# Patient Record
Sex: MALE | Race: WHITE | Employment: OTHER | URBAN - METROPOLITAN AREA
[De-identification: names, ages, dates, MRNs, and addresses within clinical notes are randomized per-mention and may not be internally consistent; named-entity substitution may affect disease eponyms.]

---

## 2017-01-16 RX ORDER — FENOFIBRATE 160 MG/1
TABLET ORAL
Qty: 30 TABLET | Refills: 11 | Status: SHIPPED | OUTPATIENT
Start: 2017-01-16 | End: 2017-02-21

## 2017-01-16 RX ORDER — METOPROLOL SUCCINATE 50 MG/1
TABLET, EXTENDED RELEASE ORAL
Qty: 30 TABLET | Refills: 11 | Status: SHIPPED | OUTPATIENT
Start: 2017-01-16 | End: 2017-02-21

## 2017-01-16 RX ORDER — OLMESARTAN MEDOXOMIL 40 MG/1
TABLET ORAL
Qty: 30 TABLET | Refills: 11 | Status: SHIPPED | OUTPATIENT
Start: 2017-01-16 | End: 2017-02-21

## 2017-02-21 ENCOUNTER — OFFICE VISIT (OUTPATIENT)
Dept: INTERNAL MEDICINE CLINIC | Facility: CLINIC | Age: 62
End: 2017-02-21

## 2017-02-21 VITALS
OXYGEN SATURATION: 97 % | SYSTOLIC BLOOD PRESSURE: 138 MMHG | WEIGHT: 225 LBS | RESPIRATION RATE: 18 BRPM | HEIGHT: 73 IN | DIASTOLIC BLOOD PRESSURE: 82 MMHG | HEART RATE: 71 BPM | BODY MASS INDEX: 29.82 KG/M2

## 2017-02-21 DIAGNOSIS — I10 ESSENTIAL HYPERTENSION: ICD-10-CM

## 2017-02-21 DIAGNOSIS — Z00.00 WELLNESS EXAMINATION: Primary | ICD-10-CM

## 2017-02-21 DIAGNOSIS — Z12.5 SCREENING FOR PROSTATE CANCER: ICD-10-CM

## 2017-02-21 DIAGNOSIS — E78.1 HYPERTRIGLYCERIDEMIA: ICD-10-CM

## 2017-02-21 LAB
ALBUMIN SERPL BCP-MCNC: 4.5 G/DL (ref 3.5–4.8)
ALBUMIN/GLOB SERPL: 1.5 {RATIO} (ref 1–2)
ALP SERPL-CCNC: 35 U/L (ref 32–100)
ALT SERPL-CCNC: 46 U/L (ref 17–63)
ANION GAP SERPL CALC-SCNC: 8 MMOL/L (ref 0–18)
AST SERPL-CCNC: 32 U/L (ref 15–41)
BILIRUB SERPL-MCNC: 1.4 MG/DL (ref 0.3–1.2)
BUN SERPL-MCNC: 19 MG/DL (ref 8–20)
BUN/CREAT SERPL: 16.7 (ref 10–20)
CALCIUM SERPL-MCNC: 9.7 MG/DL (ref 8.5–10.5)
CHLORIDE SERPL-SCNC: 107 MMOL/L (ref 95–110)
CHOLEST SERPL-MCNC: 128 MG/DL (ref 110–200)
CO2 SERPL-SCNC: 27 MMOL/L (ref 22–32)
CREAT SERPL-MCNC: 1.14 MG/DL (ref 0.5–1.5)
ERYTHROCYTE [DISTWIDTH] IN BLOOD BY AUTOMATED COUNT: 13.7 % (ref 11–15)
GLOBULIN PLAS-MCNC: 3 G/DL (ref 2.5–3.7)
GLUCOSE SERPL-MCNC: 99 MG/DL (ref 70–99)
HCT VFR BLD AUTO: 49.3 % (ref 41–52)
HDLC SERPL-MCNC: 24 MG/DL
HGB BLD-MCNC: 16.5 G/DL (ref 13.5–17.5)
LDLC SERPL CALC-MCNC: 57 MG/DL (ref 0–99)
MCH RBC QN AUTO: 28.4 PG (ref 27–32)
MCHC RBC AUTO-ENTMCNC: 33.5 G/DL (ref 32–37)
MCV RBC AUTO: 84.8 FL (ref 80–100)
NONHDLC SERPL-MCNC: 104 MG/DL
OSMOLALITY UR CALC.SUM OF ELEC: 296 MOSM/KG (ref 275–295)
PLATELET # BLD AUTO: 191 K/UL (ref 140–400)
PMV BLD AUTO: 10.6 FL (ref 7.4–10.3)
POTASSIUM SERPL-SCNC: 4.3 MMOL/L (ref 3.3–5.1)
PROT SERPL-MCNC: 7.5 G/DL (ref 5.9–8.4)
PSA SERPL-MCNC: 0.8 NG/ML (ref 0–4)
RBC # BLD AUTO: 5.81 M/UL (ref 4.5–5.9)
SODIUM SERPL-SCNC: 142 MMOL/L (ref 136–144)
TRIGL SERPL-MCNC: 237 MG/DL (ref 1–149)
WBC # BLD AUTO: 5.2 K/UL (ref 4–11)

## 2017-02-21 PROCEDURE — 99396 PREV VISIT EST AGE 40-64: CPT | Performed by: INTERNAL MEDICINE

## 2017-02-21 PROCEDURE — 36415 COLL VENOUS BLD VENIPUNCTURE: CPT | Performed by: INTERNAL MEDICINE

## 2017-02-21 PROCEDURE — 80053 COMPREHEN METABOLIC PANEL: CPT | Performed by: INTERNAL MEDICINE

## 2017-02-21 PROCEDURE — 80061 LIPID PANEL: CPT | Performed by: INTERNAL MEDICINE

## 2017-02-21 PROCEDURE — 85027 COMPLETE CBC AUTOMATED: CPT | Performed by: INTERNAL MEDICINE

## 2017-02-21 RX ORDER — FENOFIBRATE 130 MG/1
130 CAPSULE ORAL
Qty: 306 CAPSULE | Refills: 6 | Status: SHIPPED | OUTPATIENT
Start: 2017-02-21 | End: 2019-01-11 | Stop reason: DRUGHIGH

## 2017-02-21 RX ORDER — OLMESARTAN MEDOXOMIL 40 MG/1
TABLET ORAL
Qty: 30 TABLET | Refills: 11 | Status: SHIPPED | OUTPATIENT
Start: 2017-02-21 | End: 2017-04-12

## 2017-02-21 RX ORDER — METOPROLOL SUCCINATE 50 MG/1
50 TABLET, EXTENDED RELEASE ORAL
Qty: 30 TABLET | Refills: 11 | Status: SHIPPED | OUTPATIENT
Start: 2017-02-21 | End: 2017-04-03

## 2017-02-21 RX ORDER — FENOFIBRATE 160 MG/1
160 TABLET ORAL
Qty: 30 TABLET | Refills: 11 | Status: SHIPPED | OUTPATIENT
Start: 2017-02-21 | End: 2017-04-11

## 2017-02-28 ENCOUNTER — TELEPHONE (OUTPATIENT)
Dept: INTERNAL MEDICINE CLINIC | Facility: CLINIC | Age: 62
End: 2017-02-28

## 2017-04-03 RX ORDER — METOPROLOL SUCCINATE 50 MG/1
50 TABLET, EXTENDED RELEASE ORAL
Qty: 30 TABLET | Refills: 11 | Status: SHIPPED | OUTPATIENT
Start: 2017-04-03 | End: 2018-04-06

## 2017-04-11 RX ORDER — FENOFIBRATE 160 MG/1
160 TABLET ORAL
Qty: 90 TABLET | Refills: 3 | Status: SHIPPED | OUTPATIENT
Start: 2017-04-11 | End: 2018-04-06

## 2017-04-12 RX ORDER — OLMESARTAN MEDOXOMIL 40 MG/1
TABLET ORAL
Qty: 90 TABLET | Refills: 2 | Status: SHIPPED | OUTPATIENT
Start: 2017-04-12 | End: 2018-04-06

## 2018-04-06 ENCOUNTER — OFFICE VISIT (OUTPATIENT)
Dept: INTERNAL MEDICINE CLINIC | Facility: CLINIC | Age: 63
End: 2018-04-06

## 2018-04-06 VITALS
WEIGHT: 229 LBS | HEIGHT: 73 IN | OXYGEN SATURATION: 98 % | HEART RATE: 69 BPM | TEMPERATURE: 99 F | RESPIRATION RATE: 18 BRPM | DIASTOLIC BLOOD PRESSURE: 80 MMHG | BODY MASS INDEX: 30.35 KG/M2 | SYSTOLIC BLOOD PRESSURE: 136 MMHG

## 2018-04-06 DIAGNOSIS — E78.1 PURE HYPERGLYCERIDEMIA: ICD-10-CM

## 2018-04-06 DIAGNOSIS — Z00.00 WELLNESS EXAMINATION: Primary | ICD-10-CM

## 2018-04-06 DIAGNOSIS — Z12.11 COLON CANCER SCREENING: ICD-10-CM

## 2018-04-06 DIAGNOSIS — I10 ESSENTIAL HYPERTENSION: ICD-10-CM

## 2018-04-06 DIAGNOSIS — E78.1 HYPERTRIGLYCERIDEMIA: ICD-10-CM

## 2018-04-06 DIAGNOSIS — Z12.5 PROSTATE CANCER SCREENING: ICD-10-CM

## 2018-04-06 PROCEDURE — 99396 PREV VISIT EST AGE 40-64: CPT | Performed by: INTERNAL MEDICINE

## 2018-04-06 PROCEDURE — 80061 LIPID PANEL: CPT | Performed by: INTERNAL MEDICINE

## 2018-04-06 PROCEDURE — 36415 COLL VENOUS BLD VENIPUNCTURE: CPT | Performed by: INTERNAL MEDICINE

## 2018-04-06 PROCEDURE — 84153 ASSAY OF PSA TOTAL: CPT | Performed by: INTERNAL MEDICINE

## 2018-04-06 PROCEDURE — 80053 COMPREHEN METABOLIC PANEL: CPT | Performed by: INTERNAL MEDICINE

## 2018-04-06 PROCEDURE — 85025 COMPLETE CBC W/AUTO DIFF WBC: CPT | Performed by: INTERNAL MEDICINE

## 2018-04-06 RX ORDER — OLMESARTAN MEDOXOMIL 40 MG/1
TABLET ORAL
Qty: 90 TABLET | Refills: 3 | Status: SHIPPED | OUTPATIENT
Start: 2018-04-06 | End: 2019-01-11

## 2018-04-06 RX ORDER — METOPROLOL SUCCINATE 50 MG/1
50 TABLET, EXTENDED RELEASE ORAL
Qty: 30 TABLET | Refills: 11 | Status: SHIPPED | OUTPATIENT
Start: 2018-04-06 | End: 2018-04-06

## 2018-04-06 RX ORDER — FENOFIBRATE 160 MG/1
160 TABLET ORAL
Qty: 90 TABLET | Refills: 3 | Status: SHIPPED | OUTPATIENT
Start: 2018-04-06 | End: 2018-04-12

## 2018-04-06 RX ORDER — OLMESARTAN MEDOXOMIL 40 MG/1
TABLET ORAL
Qty: 90 TABLET | Refills: 2 | Status: SHIPPED | OUTPATIENT
Start: 2018-04-06 | End: 2018-04-06

## 2018-04-06 RX ORDER — METOPROLOL SUCCINATE 50 MG/1
50 TABLET, EXTENDED RELEASE ORAL
Qty: 30 TABLET | Refills: 11 | Status: SHIPPED | OUTPATIENT
Start: 2018-04-06 | End: 2018-04-11

## 2018-04-06 RX ORDER — METOPROLOL SUCCINATE 50 MG/1
50 TABLET, EXTENDED RELEASE ORAL
Qty: 90 TABLET | Refills: 3 | Status: SHIPPED | OUTPATIENT
Start: 2018-04-06 | End: 2019-01-11

## 2018-04-06 RX ORDER — OLMESARTAN MEDOXOMIL 40 MG/1
TABLET ORAL
Qty: 90 TABLET | Refills: 2 | Status: SHIPPED | OUTPATIENT
Start: 2018-04-06 | End: 2018-04-11

## 2018-04-06 NOTE — PROGRESS NOTES
HPI:    Patient ID: Tristan Emery is a 58year old male. Patient here for annual wellness check up and fu on htn and hypertriglyceridemia. Is due for fasting blood work. Ias likely due for colonoscopy - will check his records.     Review of Systems no tenderness or effusion. Neurological: He is alert and oriented to person, place, and time. Skin: No rash noted. Psychiatric: He has a normal mood and affect.  His behavior is normal. Judgment and thought content normal.              ASSESSMENT/PLAN

## 2018-04-06 NOTE — PROGRESS NOTES
Pt presented to clinic today for blood draw. Per physician able to draw orders. Orders  documented within chart. Pt tolerated lab draw well.  verified.   Orders drawn include: cbc, psa, cmp, lipid   Site of draw: rt felicia Esposito CMA

## 2018-04-11 RX ORDER — METOPROLOL SUCCINATE 50 MG/1
50 TABLET, EXTENDED RELEASE ORAL
Qty: 30 TABLET | Refills: 11 | Status: SHIPPED | OUTPATIENT
Start: 2018-04-11 | End: 2019-01-11

## 2018-04-11 RX ORDER — OLMESARTAN MEDOXOMIL 40 MG/1
TABLET ORAL
Qty: 90 TABLET | Refills: 2 | Status: SHIPPED | OUTPATIENT
Start: 2018-04-11 | End: 2019-01-11

## 2018-04-12 RX ORDER — FENOFIBRATE 160 MG/1
160 TABLET ORAL
Qty: 90 TABLET | Refills: 3 | Status: SHIPPED | OUTPATIENT
Start: 2018-04-12 | End: 2019-01-11

## 2018-10-18 ENCOUNTER — TELEPHONE (OUTPATIENT)
Dept: GASTROENTEROLOGY | Facility: CLINIC | Age: 63
End: 2018-10-18

## 2018-10-18 ENCOUNTER — OFFICE VISIT (OUTPATIENT)
Dept: GASTROENTEROLOGY | Facility: CLINIC | Age: 63
End: 2018-10-18
Payer: COMMERCIAL

## 2018-10-18 VITALS
BODY MASS INDEX: 30.09 KG/M2 | HEART RATE: 68 BPM | DIASTOLIC BLOOD PRESSURE: 101 MMHG | SYSTOLIC BLOOD PRESSURE: 160 MMHG | HEIGHT: 73 IN | WEIGHT: 227 LBS

## 2018-10-18 DIAGNOSIS — Z12.12 SCREENING FOR COLORECTAL CANCER: Primary | ICD-10-CM

## 2018-10-18 DIAGNOSIS — Z12.11 SCREEN FOR COLON CANCER: Primary | ICD-10-CM

## 2018-10-18 DIAGNOSIS — Z12.11 SCREENING FOR COLORECTAL CANCER: Primary | ICD-10-CM

## 2018-10-18 PROCEDURE — 99243 OFF/OP CNSLTJ NEW/EST LOW 30: CPT | Performed by: INTERNAL MEDICINE

## 2018-10-18 NOTE — TELEPHONE ENCOUNTER
Scheduled for:  Colonoscopy [id number]  Provider Name: Dr. Juan Francisco Limon  Date:  12/3/18  Location:  [facility]  Sedation:  IV  Time:  8:30 am, arrival 7:30 am  Prep: Suprep  Meds/Allergies Reconciled?:  Physician reviewed  Diagnosis with codes:  Screening Z12.11  Was patient in

## 2018-10-18 NOTE — PROGRESS NOTES
[facility], [facility] - Gastroenterology                                                                                                  Clinic History and Physical mg total) by mouth once daily. Disp: 90 tablet Rfl: 3   Olmesartan Medoxomil 40 MG Oral Tab TAKE 1 TABLET (40 MG TOTAL) BY MOUTH ONCE DAILY.  Disp: 90 tablet Rfl: 2   Metoprolol Succinate ER 50 MG Oral Tablet 24 Hr Take 1 tablet (50 mg total) by mouth once perforation, as well as the cardiopulmonary risks of anesthesia and all potentially leading to prolonged hospitalization or surgical intervention. I also mentioned the possibility of missed lesion. All questions were answered to the patient’s satisfaction.

## 2018-10-18 NOTE — PATIENT INSTRUCTIONS
1. Schedule colonoscopy with IV/conscious sedation with Dr. Courtney Board    2.  bowel prep from pharmacy (split Suprep )    3. Continue all medications for procedure    4. Read all bowel prep instructions carefully    5.  AVOID seeds, nuts, popcorn, raw

## 2018-12-03 ENCOUNTER — TELEPHONE (OUTPATIENT)
Dept: GASTROENTEROLOGY | Facility: CLINIC | Age: 63
End: 2018-12-03

## 2018-12-03 ENCOUNTER — HOSPITAL ENCOUNTER (OUTPATIENT)
Facility: HOSPITAL | Age: 63
Setting detail: HOSPITAL OUTPATIENT SURGERY
Discharge: HOME | End: 2018-12-03
Attending: INTERNAL MEDICINE | Admitting: INTERNAL MEDICINE
Payer: COMMERCIAL

## 2018-12-03 DIAGNOSIS — Z12.11 SCREEN FOR COLON CANCER: ICD-10-CM

## 2018-12-03 PROCEDURE — G0500 MOD SEDAT ENDO SERVICE >5YRS: HCPCS | Performed by: INTERNAL MEDICINE

## 2018-12-03 PROCEDURE — 0DBK8ZX EXCISION OF ASCENDING COLON, VIA NATURAL OR ARTIFICIAL OPENING ENDOSCOPIC, DIAGNOSTIC: ICD-10-PCS | Performed by: INTERNAL MEDICINE

## 2018-12-03 PROCEDURE — 0DBP8ZX EXCISION OF RECTUM, VIA NATURAL OR ARTIFICIAL OPENING ENDOSCOPIC, DIAGNOSTIC: ICD-10-PCS | Performed by: INTERNAL MEDICINE

## 2018-12-03 PROCEDURE — 45385 COLONOSCOPY W/LESION REMOVAL: CPT | Performed by: INTERNAL MEDICINE

## 2018-12-03 PROCEDURE — 0DBN8ZX EXCISION OF SIGMOID COLON, VIA NATURAL OR ARTIFICIAL OPENING ENDOSCOPIC, DIAGNOSTIC: ICD-10-PCS | Performed by: INTERNAL MEDICINE

## 2018-12-03 PROCEDURE — 45380 COLONOSCOPY AND BIOPSY: CPT | Performed by: INTERNAL MEDICINE

## 2018-12-03 PROCEDURE — 0DBL8ZX EXCISION OF TRANSVERSE COLON, VIA NATURAL OR ARTIFICIAL OPENING ENDOSCOPIC, DIAGNOSTIC: ICD-10-PCS | Performed by: INTERNAL MEDICINE

## 2018-12-03 RX ORDER — MIDAZOLAM HYDROCHLORIDE 1 MG/ML
INJECTION INTRAMUSCULAR; INTRAVENOUS
Status: DISCONTINUED | OUTPATIENT
Start: 2018-12-03 | End: 2018-12-03

## 2018-12-03 RX ORDER — SODIUM CHLORIDE 0.9 % (FLUSH) 0.9 %
10 SYRINGE (ML) INJECTION AS NEEDED
Status: DISCONTINUED | OUTPATIENT
Start: 2018-12-03 | End: 2018-12-03

## 2018-12-03 RX ORDER — SODIUM CHLORIDE, SODIUM LACTATE, POTASSIUM CHLORIDE, CALCIUM CHLORIDE 600; 310; 30; 20 MG/100ML; MG/100ML; MG/100ML; MG/100ML
INJECTION, SOLUTION INTRAVENOUS CONTINUOUS
Status: DISCONTINUED | OUTPATIENT
Start: 2018-12-03 | End: 2018-12-03

## 2018-12-03 RX ORDER — MIDAZOLAM HYDROCHLORIDE 1 MG/ML
1 INJECTION INTRAMUSCULAR; INTRAVENOUS EVERY 5 MIN PRN
Status: DISCONTINUED | OUTPATIENT
Start: 2018-12-03 | End: 2018-12-03

## 2018-12-03 NOTE — H&P
History & Physical Examination    Patient Name: Peter Palmer  MRN: [id number]  [facility]: [id number]  YOB: 1955    Diagnosis: colorectal cancer screening      Medications Prior to Admission:  Fenofibrate 160 MG Oral Tab Take 1 tablet (160 mg t 12/1/2008     History reviewed. No pertinent family history.   Social History    Tobacco Use      Smoking status: Never Smoker      Smokeless tobacco: Never Used    Alcohol use: No      SYSTEM Check if Physical Exam is Normal If not normal, please explain:

## 2018-12-03 NOTE — OPERATIVE REPORT
Colonoscopy Report    Lorna Miguel Angelbelem     1955 Age 58year old   PCP Rossana Ferguson MD Endoscopist Sweta Gonzalez MD     Date of procedure: 18    Procedure: Colonoscopy w/ biopsy and snare polypectomy    Pre-operative diagnosis: colore Withdrawal time was 20 minutes. Air was then withdrawn and the endoscope was removed. The patient tolerated the procedure well. There were no immediate postoperative complications.  The patient’s vital signs were monitored throughout the procedure and re have not received your pathology results either by phone or letter within 2 weeks, please call our office at [phone].       Daya Solis MD  [address] [facility] - Gastroenterology  12/3/2018  9:06 AM

## 2018-12-03 NOTE — TELEPHONE ENCOUNTER
I mailed out colonoscopy results letter to pt  Updated health maintenance  Entered into 5yr recall  Recall colon in 5 years per.  Colon done 12/03/18     GI staff: please place recall for colonoscopy in 5 years

## 2018-12-04 VITALS
HEART RATE: 62 BPM | WEIGHT: 215 LBS | SYSTOLIC BLOOD PRESSURE: 138 MMHG | HEIGHT: 72 IN | DIASTOLIC BLOOD PRESSURE: 93 MMHG | BODY MASS INDEX: 29.12 KG/M2 | OXYGEN SATURATION: 95 % | RESPIRATION RATE: 20 BRPM

## 2019-01-11 ENCOUNTER — OFFICE VISIT (OUTPATIENT)
Dept: INTERNAL MEDICINE CLINIC | Facility: CLINIC | Age: 64
End: 2019-01-11
Payer: COMMERCIAL

## 2019-01-11 VITALS
WEIGHT: 226 LBS | SYSTOLIC BLOOD PRESSURE: 130 MMHG | HEIGHT: 72 IN | BODY MASS INDEX: 30.61 KG/M2 | TEMPERATURE: 98 F | OXYGEN SATURATION: 99 % | DIASTOLIC BLOOD PRESSURE: 86 MMHG | HEART RATE: 63 BPM

## 2019-01-11 DIAGNOSIS — I10 ESSENTIAL HYPERTENSION: Primary | ICD-10-CM

## 2019-01-11 DIAGNOSIS — E78.1 PURE HYPERGLYCERIDEMIA: ICD-10-CM

## 2019-01-11 DIAGNOSIS — Z12.5 SCREENING FOR PROSTATE CANCER: ICD-10-CM

## 2019-01-11 LAB
ALBUMIN SERPL BCP-MCNC: 4.5 G/DL (ref 3.5–4.8)
ALBUMIN/GLOB SERPL: 1.7 {RATIO} (ref 1–2)
ALP SERPL-CCNC: 38 U/L (ref 32–100)
ALT SERPL-CCNC: 33 U/L (ref 17–63)
ANION GAP SERPL CALC-SCNC: 12 MMOL/L (ref 0–18)
AST SERPL-CCNC: 32 U/L (ref 15–41)
BASOPHILS # BLD: 0.1 K/UL (ref 0–0.2)
BASOPHILS NFR BLD: 1 %
BILIRUB SERPL-MCNC: 1.3 MG/DL (ref 0.3–1.2)
BUN SERPL-MCNC: 15 MG/DL (ref 8–20)
BUN/CREAT SERPL: 12.9 (ref 10–20)
CALCIUM SERPL-MCNC: 10 MG/DL (ref 8.5–10.5)
CHLORIDE SERPL-SCNC: 105 MMOL/L (ref 95–110)
CHOLEST SERPL-MCNC: 156 MG/DL (ref 110–200)
CO2 SERPL-SCNC: 25 MMOL/L (ref 22–32)
COMPLEXED PSA SERPL-MCNC: 1.24 NG/ML (ref 0.01–4)
CREAT SERPL-MCNC: 1.16 MG/DL (ref 0.5–1.5)
EOSINOPHIL # BLD: 0.2 K/UL (ref 0–0.7)
EOSINOPHIL NFR BLD: 4 %
ERYTHROCYTE [DISTWIDTH] IN BLOOD BY AUTOMATED COUNT: 13.8 % (ref 11–15)
GLOBULIN PLAS-MCNC: 2.7 G/DL (ref 2.5–3.7)
GLUCOSE SERPL-MCNC: 118 MG/DL (ref 70–99)
HCT VFR BLD AUTO: 49.8 % (ref 41–52)
HDLC SERPL-MCNC: 39 MG/DL
HGB BLD-MCNC: 16.7 G/DL (ref 13.5–17.5)
LDLC SERPL CALC-MCNC: 77 MG/DL (ref 0–99)
LYMPHOCYTES # BLD: 1.5 K/UL (ref 1–4)
LYMPHOCYTES NFR BLD: 25 %
MCH RBC QN AUTO: 28.8 PG (ref 27–32)
MCHC RBC AUTO-ENTMCNC: 33.5 G/DL (ref 32–37)
MCV RBC AUTO: 86.1 FL (ref 80–100)
MONOCYTES # BLD: 0.5 K/UL (ref 0–1)
MONOCYTES NFR BLD: 8 %
NEUTROPHILS # BLD AUTO: 3.9 K/UL (ref 1.8–7.7)
NEUTROPHILS NFR BLD: 63 %
NONHDLC SERPL-MCNC: 117 MG/DL
OSMOLALITY UR CALC.SUM OF ELEC: 296 MOSM/KG (ref 275–295)
PATIENT FASTING: YES
PLATELET # BLD AUTO: 176 K/UL (ref 140–400)
PMV BLD AUTO: 10.1 FL (ref 7.4–10.3)
POTASSIUM SERPL-SCNC: 3.8 MMOL/L (ref 3.3–5.1)
PROT SERPL-MCNC: 7.2 G/DL (ref 5.9–8.4)
PSA SERPL-MCNC: 1 NG/ML (ref 0–4)
RBC # BLD AUTO: 5.78 M/UL (ref 4.5–5.9)
SODIUM SERPL-SCNC: 142 MMOL/L (ref 136–144)
TRIGL SERPL-MCNC: 200 MG/DL (ref 1–149)
WBC # BLD AUTO: 6.2 K/UL (ref 4–11)

## 2019-01-11 PROCEDURE — 85025 COMPLETE CBC W/AUTO DIFF WBC: CPT | Performed by: INTERNAL MEDICINE

## 2019-01-11 PROCEDURE — 80061 LIPID PANEL: CPT | Performed by: INTERNAL MEDICINE

## 2019-01-11 PROCEDURE — 80053 COMPREHEN METABOLIC PANEL: CPT | Performed by: INTERNAL MEDICINE

## 2019-01-11 PROCEDURE — 84153 ASSAY OF PSA TOTAL: CPT | Performed by: INTERNAL MEDICINE

## 2019-01-11 PROCEDURE — 99214 OFFICE O/P EST MOD 30 MIN: CPT | Performed by: INTERNAL MEDICINE

## 2019-01-11 RX ORDER — FENOFIBRATE 160 MG/1
160 TABLET ORAL
Qty: 90 TABLET | Refills: 3 | Status: SHIPPED | OUTPATIENT
Start: 2019-01-11 | End: 2019-08-26 | Stop reason: ALTCHOICE

## 2019-01-11 RX ORDER — OLMESARTAN MEDOXOMIL 40 MG/1
TABLET ORAL
Qty: 90 TABLET | Refills: 3 | Status: SHIPPED | OUTPATIENT
Start: 2019-01-11 | End: 2019-08-26 | Stop reason: RX

## 2019-01-11 RX ORDER — METOPROLOL SUCCINATE 50 MG/1
50 TABLET, EXTENDED RELEASE ORAL
Qty: 90 TABLET | Refills: 3 | Status: SHIPPED | OUTPATIENT
Start: 2019-01-11 | End: 2020-04-09

## 2019-01-11 NOTE — PROGRESS NOTES
HPI:    Patient ID: Rito Rudd is a 58year old male. Pt here for a recheck of Bp and hyperlipidpemia. HAd colonoscopy - 5 polyps. Recently retired from work. Colonoscopy showed 5 polyps - repeat in  5 years.     Review of Systems   Constituti polyps - repeat colonoscopy in 5 years  No orders of the defined types were placed in this encounter.       Meds This Visit:  Requested Prescriptions     Signed Prescriptions Disp Refills   • Fenofibrate 160 MG Oral Tab 90 tablet 3     Sig: Take 1 tablet (1

## 2019-03-11 ENCOUNTER — TELEPHONE (OUTPATIENT)
Dept: INTERNAL MEDICINE CLINIC | Facility: CLINIC | Age: 64
End: 2019-03-11

## 2019-03-11 NOTE — TELEPHONE ENCOUNTER
Fax received from pharmacy. Olemsartan Medoxomil 40 mg is on backorder. Please change to different strength or medication.

## 2019-03-14 ENCOUNTER — NURSE ONLY (OUTPATIENT)
Dept: INTERNAL MEDICINE CLINIC | Facility: CLINIC | Age: 64
End: 2019-03-14
Payer: MEDICARE

## 2019-03-14 DIAGNOSIS — R73.9 HYPERGLYCEMIA: ICD-10-CM

## 2019-03-14 PROCEDURE — 36415 COLL VENOUS BLD VENIPUNCTURE: CPT | Performed by: INTERNAL MEDICINE

## 2019-03-14 NOTE — PROGRESS NOTES
Pt presented to clinic today for blood draw. Per physician able to draw orders. Orders  documented within chart. Pt tolerated lab draw well.  verified.   Orders drawn include: A1C  Site of draw: right arm

## 2019-03-15 LAB — HEMOGLOBIN A1C: 5.4 % OF TOTAL HGB

## 2019-08-26 ENCOUNTER — OFFICE VISIT (OUTPATIENT)
Dept: INTERNAL MEDICINE CLINIC | Facility: CLINIC | Age: 64
End: 2019-08-26
Payer: MEDICARE

## 2019-08-26 VITALS
HEART RATE: 65 BPM | HEIGHT: 72 IN | BODY MASS INDEX: 29.99 KG/M2 | OXYGEN SATURATION: 98 % | SYSTOLIC BLOOD PRESSURE: 136 MMHG | DIASTOLIC BLOOD PRESSURE: 82 MMHG | WEIGHT: 221.38 LBS

## 2019-08-26 DIAGNOSIS — I10 ESSENTIAL HYPERTENSION: Primary | ICD-10-CM

## 2019-08-26 DIAGNOSIS — E78.1 PURE HYPERGLYCERIDEMIA: ICD-10-CM

## 2019-08-26 PROCEDURE — 99214 OFFICE O/P EST MOD 30 MIN: CPT | Performed by: INTERNAL MEDICINE

## 2019-08-26 RX ORDER — ALPRAZOLAM 0.25 MG/1
0.25 TABLET ORAL EVERY 6 HOURS PRN
Qty: 30 TABLET | Refills: 1 | Status: SHIPPED | OUTPATIENT
Start: 2019-08-26 | End: 2021-02-12 | Stop reason: WASHOUT

## 2019-08-26 RX ORDER — GEMFIBROZIL 600 MG/1
600 TABLET, FILM COATED ORAL
Qty: 60 TABLET | Refills: 11 | Status: SHIPPED | OUTPATIENT
Start: 2019-08-26 | End: 2020-10-27

## 2019-08-26 NOTE — PROGRESS NOTES
HPI:    Patient ID: Serena Sullivan is a 71year old male. Pt here for a fu on Bp - has had some elevated readings in the last weeks. [place] some Alprazolam  - which helped Bp get better. Review of Systems   Constitutional: Negative.     HENT daily before meals.        Imaging & Referrals:  None         TIME COMPONENT:     Jimmy Aponte MD  8/26/2019

## 2020-02-11 ENCOUNTER — OFFICE VISIT (OUTPATIENT)
Dept: INTERNAL MEDICINE CLINIC | Facility: CLINIC | Age: 65
End: 2020-02-11
Payer: MEDICARE

## 2020-02-11 VITALS
OXYGEN SATURATION: 98 % | WEIGHT: 220 LBS | HEART RATE: 72 BPM | SYSTOLIC BLOOD PRESSURE: 130 MMHG | DIASTOLIC BLOOD PRESSURE: 82 MMHG | HEIGHT: 72 IN | BODY MASS INDEX: 29.8 KG/M2

## 2020-02-11 DIAGNOSIS — E78.1 PURE HYPERGLYCERIDEMIA: ICD-10-CM

## 2020-02-11 DIAGNOSIS — I10 ESSENTIAL HYPERTENSION: ICD-10-CM

## 2020-02-11 DIAGNOSIS — Z00.00 MEDICARE ANNUAL WELLNESS VISIT, SUBSEQUENT: Primary | ICD-10-CM

## 2020-02-11 DIAGNOSIS — Z12.5 SCREENING FOR PROSTATE CANCER: ICD-10-CM

## 2020-02-11 PROCEDURE — 96160 PT-FOCUSED HLTH RISK ASSMT: CPT | Performed by: INTERNAL MEDICINE

## 2020-02-11 PROCEDURE — 99397 PER PM REEVAL EST PAT 65+ YR: CPT | Performed by: INTERNAL MEDICINE

## 2020-02-11 PROCEDURE — G0402 INITIAL PREVENTIVE EXAM: HCPCS | Performed by: INTERNAL MEDICINE

## 2020-02-11 NOTE — PROGRESS NOTES
HPI:   Jen Burks is a 76year old male who presents for a MA Supervisit.     Patient Active Problem List:     HTN (hypertension) controlled with rx     Hypertriglyceridemia on gemfibrozal     Hyperlipidemia as above                General Health ?: No    Do you have a living will?: No   Was Medicare Assessment Questionnaire completed by patient and sent to HIM for scanning? Yes    Please go to \"Cognitive Assessment\" under Medicare Assessment section in Charting, test patient and document. SYSTEMS:   GENERAL: feels well otherwise  SKIN: denies any unusual skin lesions eczema on left earlobe  EYES: denies blurred vision or double vision  HEENT: denies nasal congestion, sinus pain or ST  LUNGS: denies shortness of breath with exertion  CARDIOV wellness visit, subsequent    Essential hypertension    Pure hyperglyceridemia    Screening for prostate cancer         The patient indicates understanding of these issues and agrees to the plan.   The patient is asked to return in 3mfor fu       PREVENTATI No flowsheet data found. Creatinine  Annually Creatinine (mg/dL)   Date Value   01/11/2019 1.16     CREATININE (P) (mg/dL)   Date Value   01/26/2016 1.24    No flowsheet data found.     BUN  Annually BUN (mg/dL)   Date Value   01/11/2019 15   01/26/20 lisinopril 40 MG Oral Tab Take 1 tablet (40 mg total) by mouth daily. 90 tablet 3   • Metoprolol Succinate ER 50 MG Oral Tablet 24 Hr Take 1 tablet (50 mg total) by mouth once daily.  90 tablet 3     Allergies:No Known Allergies   PHYSICAL EXAM:   Physical

## 2020-02-12 LAB
ABSOLUTE BASOPHILS: 61 CELLS/UL (ref 0–200)
ABSOLUTE EOSINOPHILS: 207 CELLS/UL (ref 15–500)
ABSOLUTE LYMPHOCYTES: 1565 CELLS/UL (ref 850–3900)
ABSOLUTE MONOCYTES: 404 CELLS/UL (ref 200–950)
ABSOLUTE NEUTROPHILS: 2463 CELLS/UL (ref 1500–7800)
ALBUMIN/GLOBULIN RATIO: 1.8 (CALC) (ref 1–2.5)
ALBUMIN: 4.6 G/DL (ref 3.6–5.1)
ALKALINE PHOSPHATASE: 45 U/L (ref 35–144)
ALT: 25 U/L (ref 9–46)
AST: 24 U/L (ref 10–35)
BASOPHILS: 1.3 %
BILIRUBIN, TOTAL: 1.1 MG/DL (ref 0.2–1.2)
BUN: 19 MG/DL (ref 7–25)
CALCIUM: 10.2 MG/DL (ref 8.6–10.3)
CARBON DIOXIDE: 24 MMOL/L (ref 20–32)
CHLORIDE: 104 MMOL/L (ref 98–110)
CHOL/HDLC RATIO: 5.1 (CALC)
CHOLESTEROL, TOTAL: 148 MG/DL
CREATININE: 1.06 MG/DL (ref 0.7–1.25)
EGFR IF AFRICN AM: 84 ML/MIN/1.73M2
EGFR IF NONAFRICN AM: 73 ML/MIN/1.73M2
EOSINOPHILS: 4.4 %
GLOBULIN: 2.6 G/DL (CALC) (ref 1.9–3.7)
GLUCOSE: 116 MG/DL (ref 65–99)
HDL CHOLESTEROL: 29 MG/DL
HEMATOCRIT: 49.1 % (ref 38.5–50)
HEMOGLOBIN: 16.8 G/DL (ref 13.2–17.1)
LDL-CHOLESTEROL: 86 MG/DL (CALC)
LYMPHOCYTES: 33.3 %
MCH: 28.9 PG (ref 27–33)
MCHC: 34.2 G/DL (ref 32–36)
MCV: 84.5 FL (ref 80–100)
MONOCYTES: 8.6 %
MPV: 12 FL (ref 7.5–12.5)
NEUTROPHILS: 52.4 %
NON-HDL CHOLESTEROL: 119 MG/DL (CALC)
PLATELET COUNT: 184 THOUSAND/UL (ref 140–400)
POTASSIUM: 4.7 MMOL/L (ref 3.5–5.3)
PROTEIN, TOTAL: 7.2 G/DL (ref 6.1–8.1)
PSA, TOTAL: 1 NG/ML
RDW: 13 % (ref 11–15)
RED BLOOD CELL COUNT: 5.81 MILLION/UL (ref 4.2–5.8)
SODIUM: 138 MMOL/L (ref 135–146)
TRIGLYCERIDES: 239 MG/DL
WHITE BLOOD CELL COUNT: 4.7 THOUSAND/UL (ref 3.8–10.8)

## 2020-04-09 RX ORDER — LISINOPRIL 40 MG/1
TABLET ORAL
Qty: 90 TABLET | Refills: 3 | Status: SHIPPED | OUTPATIENT
Start: 2020-04-09 | End: 2021-02-12

## 2020-04-09 RX ORDER — METOPROLOL SUCCINATE 50 MG/1
TABLET, EXTENDED RELEASE ORAL
Qty: 90 TABLET | Refills: 3 | Status: SHIPPED | OUTPATIENT
Start: 2020-04-09 | End: 2021-02-12

## 2020-06-20 ENCOUNTER — OFFICE VISIT (OUTPATIENT)
Dept: INTERNAL MEDICINE CLINIC | Facility: CLINIC | Age: 65
End: 2020-06-20
Payer: MEDICARE

## 2020-06-20 VITALS
SYSTOLIC BLOOD PRESSURE: 118 MMHG | HEIGHT: 72.64 IN | DIASTOLIC BLOOD PRESSURE: 74 MMHG | WEIGHT: 216 LBS | HEART RATE: 74 BPM | OXYGEN SATURATION: 98 % | BODY MASS INDEX: 28.63 KG/M2

## 2020-06-20 DIAGNOSIS — E78.1 PURE HYPERGLYCERIDEMIA: Primary | ICD-10-CM

## 2020-06-20 DIAGNOSIS — R73.9 HYPERGLYCEMIA: ICD-10-CM

## 2020-06-20 PROCEDURE — 36415 COLL VENOUS BLD VENIPUNCTURE: CPT | Performed by: INTERNAL MEDICINE

## 2020-06-20 PROCEDURE — 99214 OFFICE O/P EST MOD 30 MIN: CPT | Performed by: INTERNAL MEDICINE

## 2020-06-20 NOTE — PROGRESS NOTES
HPI:    Patient ID: Mikal Ortega is a 76year old male. HPI Patient here for fu on hyperglycemia and hypertriglyceridemia. Had elevated glucose and trigs on last blood work up. Has been on low carb diet.     Review of Systems   Constitutional: Ne Visit:  Requested Prescriptions      No prescriptions requested or ordered in this encounter       Imaging & Referrals:  None       MS#[id number]

## 2020-10-27 RX ORDER — GEMFIBROZIL 600 MG/1
600 TABLET, FILM COATED ORAL
Qty: 180 TABLET | Refills: 3 | Status: SHIPPED | OUTPATIENT
Start: 2020-10-27 | End: 2021-02-12 | Stop reason: ALTCHOICE

## 2020-11-10 ENCOUNTER — TELEPHONE (OUTPATIENT)
Dept: INTERNAL MEDICINE CLINIC | Facility: CLINIC | Age: 65
End: 2020-11-10

## 2021-02-12 ENCOUNTER — OFFICE VISIT (OUTPATIENT)
Dept: INTERNAL MEDICINE CLINIC | Facility: CLINIC | Age: 66
End: 2021-02-12
Payer: COMMERCIAL

## 2021-02-12 VITALS
TEMPERATURE: 96 F | WEIGHT: 221 LBS | OXYGEN SATURATION: 97 % | HEIGHT: 72.64 IN | SYSTOLIC BLOOD PRESSURE: 130 MMHG | BODY MASS INDEX: 29.29 KG/M2 | DIASTOLIC BLOOD PRESSURE: 80 MMHG | HEART RATE: 62 BPM

## 2021-02-12 DIAGNOSIS — Z00.00 MEDICARE ANNUAL WELLNESS VISIT, SUBSEQUENT: Primary | ICD-10-CM

## 2021-02-12 DIAGNOSIS — Z12.5 SCREENING FOR PROSTATE CANCER: ICD-10-CM

## 2021-02-12 DIAGNOSIS — Z23 NEED FOR PNEUMOCOCCAL VACCINATION: ICD-10-CM

## 2021-02-12 DIAGNOSIS — I10 ESSENTIAL HYPERTENSION: ICD-10-CM

## 2021-02-12 DIAGNOSIS — E78.1 PURE HYPERGLYCERIDEMIA: ICD-10-CM

## 2021-02-12 DIAGNOSIS — R73.9 HYPERGLYCEMIA: ICD-10-CM

## 2021-02-12 PROCEDURE — 90732 PPSV23 VACC 2 YRS+ SUBQ/IM: CPT | Performed by: INTERNAL MEDICINE

## 2021-02-12 PROCEDURE — G0438 PPPS, INITIAL VISIT: HCPCS | Performed by: INTERNAL MEDICINE

## 2021-02-12 PROCEDURE — G0009 ADMIN PNEUMOCOCCAL VACCINE: HCPCS | Performed by: INTERNAL MEDICINE

## 2021-02-12 PROCEDURE — 96160 PT-FOCUSED HLTH RISK ASSMT: CPT | Performed by: INTERNAL MEDICINE

## 2021-02-12 PROCEDURE — 3008F BODY MASS INDEX DOCD: CPT | Performed by: INTERNAL MEDICINE

## 2021-02-12 PROCEDURE — 3075F SYST BP GE 130 - 139MM HG: CPT | Performed by: INTERNAL MEDICINE

## 2021-02-12 PROCEDURE — 3079F DIAST BP 80-89 MM HG: CPT | Performed by: INTERNAL MEDICINE

## 2021-02-12 PROCEDURE — 99397 PER PM REEVAL EST PAT 65+ YR: CPT | Performed by: INTERNAL MEDICINE

## 2021-02-12 RX ORDER — METOPROLOL SUCCINATE 50 MG/1
50 TABLET, EXTENDED RELEASE ORAL DAILY
Qty: 90 TABLET | Refills: 3 | Status: SHIPPED | OUTPATIENT
Start: 2021-02-12

## 2021-02-12 RX ORDER — FENOFIBRATE 160 MG/1
160 TABLET ORAL DAILY
Qty: 90 TABLET | Refills: 3 | Status: SHIPPED | OUTPATIENT
Start: 2021-02-12 | End: 2021-12-07

## 2021-02-12 RX ORDER — LISINOPRIL 40 MG/1
40 TABLET ORAL DAILY
Qty: 90 TABLET | Refills: 3 | Status: SHIPPED | OUTPATIENT
Start: 2021-02-12

## 2021-02-12 NOTE — PATIENT INSTRUCTIONS
Pt came in for pneumovax 23 injection. Injection was administrated on left arm IM. Pt tolerated well.

## 2021-02-12 NOTE — PROGRESS NOTES
HPI:    Patient ID: Chu Alcantara is a 78year old male. HPI Pt here for a Ma supervisit and fu on htn and hypertriglyceridemia. Has no new complaints currently. HPI:   Chu Alcantara is a 78year old male who presents for a MA Supervisit. 0-No    Are you on multiple medications?: 1-Yes    Does pain affect your day to day activities?: 0-No     Have you had any memory issues?: 0-No    Fall/Risk Scorin          Depression Screening (PHQ-2/PHQ-9): Over the LAST 2 WEEKS Performed by Suma Gonzalez MD at [facility] ENDOSCOPY   • COLONOSCOPY & POLYPECTOMY  12/1/2008      No family history on file.    SOCIAL HISTORY:   Social History    Tobacco Use      Smoking status: Never Smoker      Smokeless tobacco: Never Used    [employer] deferred  MUSCULOSKELETAL: back is not tender, FROM of the back  EXTREMITIES: no edema  NEURO: Oriented times three, cranial nerves are intact, motor and sensory are grossly intact    ASSESSMENT AND OTHER RELEVANT CHRONIC CONDITIONS:   Gabriela Round i Tetanus No orders found for this or any previous visit.  Update Immunization Activity if applicable        SPECIFIC DISEASE MONITORING Internal Lab or Procedure External Lab or Procedure   Annual Monitoring of Persistent     Medications (ACE/ARB, digoxin di subsequent  (primary encounter diagnosis) check labs  Hyperglycemia check a1c  Pure hyperglyceridemia  Change to fenofibrate  Essential hypertension on Lisinopril and metoprolol  Screening for prostate cancer psa    No orders of the defined types were plac

## 2021-02-13 LAB
ABSOLUTE BASOPHILS: 68 CELLS/UL (ref 0–200)
ABSOLUTE EOSINOPHILS: 216 CELLS/UL (ref 15–500)
ABSOLUTE LYMPHOCYTES: 1557 CELLS/UL (ref 850–3900)
ABSOLUTE MONOCYTES: 378 CELLS/UL (ref 200–950)
ABSOLUTE NEUTROPHILS: 2282 CELLS/UL (ref 1500–7800)
ALBUMIN/GLOBULIN RATIO: 1.8 (CALC) (ref 1–2.5)
ALBUMIN: 4.6 G/DL (ref 3.6–5.1)
ALKALINE PHOSPHATASE: 53 U/L (ref 35–144)
ALT: 35 U/L (ref 9–46)
AST: 30 U/L (ref 10–35)
BASOPHILS: 1.5 %
BILIRUBIN, TOTAL: 0.9 MG/DL (ref 0.2–1.2)
BUN: 18 MG/DL (ref 7–25)
CALCIUM: 9.7 MG/DL (ref 8.6–10.3)
CARBON DIOXIDE: 20 MMOL/L (ref 20–32)
CHLORIDE: 98 MMOL/L (ref 98–110)
CHOL/HDLC RATIO: 4.2 (CALC)
CHOLESTEROL, TOTAL: 138 MG/DL
CREATININE: 0.92 MG/DL (ref 0.7–1.25)
EGFR IF AFRICN AM: 99 ML/MIN/1.73M2
EGFR IF NONAFRICN AM: 86 ML/MIN/1.73M2
EOSINOPHILS: 4.8 %
GLOBULIN: 2.6 G/DL (CALC) (ref 1.9–3.7)
GLUCOSE: 105 MG/DL (ref 65–99)
HDL CHOLESTEROL: 33 MG/DL
HEMATOCRIT: 50.2 % (ref 38.5–50)
HEMOGLOBIN A1C: 5.4 % OF TOTAL HGB
HEMOGLOBIN: 17.6 G/DL (ref 13.2–17.1)
LDL-CHOLESTEROL: 78 MG/DL (CALC)
LYMPHOCYTES: 34.6 %
MCH: 29.4 PG (ref 27–33)
MCHC: 35.1 G/DL (ref 32–36)
MCV: 83.9 FL (ref 80–100)
MONOCYTES: 8.4 %
MPV: 12.3 FL (ref 7.5–12.5)
NEUTROPHILS: 50.7 %
NON-HDL CHOLESTEROL: 105 MG/DL (CALC)
PLATELET COUNT: 177 THOUSAND/UL (ref 140–400)
POTASSIUM: 5.3 MMOL/L (ref 3.5–5.3)
PROTEIN, TOTAL: 7.2 G/DL (ref 6.1–8.1)
PSA, TOTAL: 1 NG/ML
RDW: 13.2 % (ref 11–15)
RED BLOOD CELL COUNT: 5.98 MILLION/UL (ref 4.2–5.8)
SODIUM: 132 MMOL/L (ref 135–146)
TRIGLYCERIDES: 169 MG/DL
WHITE BLOOD CELL COUNT: 4.5 THOUSAND/UL (ref 3.8–10.8)

## 2021-03-13 DIAGNOSIS — Z23 NEED FOR VACCINATION: ICD-10-CM

## 2021-03-16 ENCOUNTER — IMMUNIZATION (OUTPATIENT)
Dept: LAB | Age: 66
End: 2021-03-16
Attending: HOSPITALIST
Payer: MEDICARE

## 2021-03-16 DIAGNOSIS — Z23 NEED FOR VACCINATION: Primary | ICD-10-CM

## 2021-03-16 PROCEDURE — 0001A SARSCOV2 VAC 30MCG/0.3ML IM: CPT

## 2021-04-06 ENCOUNTER — IMMUNIZATION (OUTPATIENT)
Dept: LAB | Age: 66
End: 2021-04-06
Attending: HOSPITALIST
Payer: MEDICARE

## 2021-04-06 DIAGNOSIS — Z23 NEED FOR VACCINATION: Primary | ICD-10-CM

## 2021-04-06 PROCEDURE — 0002A SARSCOV2 VAC 30MCG/0.3ML IM: CPT

## 2021-10-06 ENCOUNTER — IMMUNIZATION (OUTPATIENT)
Dept: LAB | Facility: HOSPITAL | Age: 66
End: 2021-10-06
Attending: EMERGENCY MEDICINE
Payer: MEDICARE

## 2021-10-06 DIAGNOSIS — Z23 NEED FOR VACCINATION: Primary | ICD-10-CM

## 2021-10-06 PROCEDURE — 0003A SARSCOV2 VAC 30MCG/0.3ML IM: CPT

## 2021-12-07 RX ORDER — FENOFIBRATE 160 MG/1
TABLET ORAL
Qty: 90 TABLET | Refills: 3 | Status: SHIPPED | OUTPATIENT
Start: 2021-12-07

## 2022-02-11 ENCOUNTER — OFFICE VISIT (OUTPATIENT)
Dept: INTERNAL MEDICINE CLINIC | Facility: CLINIC | Age: 67
End: 2022-02-11
Payer: COMMERCIAL

## 2022-02-11 VITALS
HEIGHT: 72.64 IN | OXYGEN SATURATION: 97 % | BODY MASS INDEX: 29.16 KG/M2 | WEIGHT: 220 LBS | SYSTOLIC BLOOD PRESSURE: 142 MMHG | DIASTOLIC BLOOD PRESSURE: 80 MMHG | HEART RATE: 62 BPM

## 2022-02-11 DIAGNOSIS — I10 ESSENTIAL HYPERTENSION: ICD-10-CM

## 2022-02-11 DIAGNOSIS — E78.1 PURE HYPERGLYCERIDEMIA: ICD-10-CM

## 2022-02-11 DIAGNOSIS — Z00.00 MEDICARE ANNUAL WELLNESS VISIT, SUBSEQUENT: Primary | ICD-10-CM

## 2022-02-11 DIAGNOSIS — Z12.5 SCREENING FOR PROSTATE CANCER: ICD-10-CM

## 2022-02-11 PROCEDURE — 36415 COLL VENOUS BLD VENIPUNCTURE: CPT | Performed by: INTERNAL MEDICINE

## 2022-02-11 PROCEDURE — 96160 PT-FOCUSED HLTH RISK ASSMT: CPT | Performed by: INTERNAL MEDICINE

## 2022-02-11 PROCEDURE — 3008F BODY MASS INDEX DOCD: CPT | Performed by: INTERNAL MEDICINE

## 2022-02-11 PROCEDURE — G0439 PPPS, SUBSEQ VISIT: HCPCS | Performed by: INTERNAL MEDICINE

## 2022-02-11 PROCEDURE — 3079F DIAST BP 80-89 MM HG: CPT | Performed by: INTERNAL MEDICINE

## 2022-02-11 PROCEDURE — 99397 PER PM REEVAL EST PAT 65+ YR: CPT | Performed by: INTERNAL MEDICINE

## 2022-02-11 PROCEDURE — 3077F SYST BP >= 140 MM HG: CPT | Performed by: INTERNAL MEDICINE

## 2022-02-11 RX ORDER — LISINOPRIL 40 MG/1
40 TABLET ORAL DAILY
Qty: 90 TABLET | Refills: 3 | Status: SHIPPED | OUTPATIENT
Start: 2022-02-11

## 2022-02-11 RX ORDER — METOPROLOL SUCCINATE 50 MG/1
50 TABLET, EXTENDED RELEASE ORAL DAILY
Qty: 90 TABLET | Refills: 3 | Status: SHIPPED | OUTPATIENT
Start: 2022-02-11

## 2022-02-12 LAB
ABSOLUTE BASOPHILS: 62 CELLS/UL (ref 0–200)
ABSOLUTE EOSINOPHILS: 179 CELLS/UL (ref 15–500)
ABSOLUTE LYMPHOCYTES: 1518 CELLS/UL (ref 850–3900)
ABSOLUTE MONOCYTES: 414 CELLS/UL (ref 200–950)
ABSOLUTE NEUTROPHILS: 3427 CELLS/UL (ref 1500–7800)
ALBUMIN/GLOBULIN RATIO: 1.7 (CALC) (ref 1–2.5)
ALBUMIN: 4.7 G/DL (ref 3.6–5.1)
ALKALINE PHOSPHATASE: 39 U/L (ref 35–144)
AST: 29 U/L (ref 10–35)
BASOPHILS: 1.1 %
BILIRUBIN, TOTAL: 1.2 MG/DL (ref 0.2–1.2)
BUN/CREATININE RATIO: 18 (CALC) (ref 6–22)
BUN: 23 MG/DL (ref 7–25)
CALCIUM: 10 MG/DL (ref 8.6–10.3)
CARBON DIOXIDE: 25 MMOL/L (ref 20–32)
CHLORIDE: 106 MMOL/L (ref 98–110)
CHOL/HDLC RATIO: 3.6 (CALC)
CHOLESTEROL, TOTAL: 142 MG/DL
CREATININE: 1.31 MG/DL (ref 0.7–1.25)
EGFR IF AFRICN AM: 64 ML/MIN/1.73M2
EGFR IF NONAFRICN AM: 56 ML/MIN/1.73M2
EOSINOPHILS: 3.2 %
GLOBULIN: 2.7 G/DL (CALC) (ref 1.9–3.7)
GLUCOSE: 109 MG/DL (ref 65–99)
HDL CHOLESTEROL: 40 MG/DL
HEMATOCRIT: 49.4 % (ref 38.5–50)
HEMOGLOBIN: 16.6 G/DL (ref 13.2–17.1)
LDL-CHOLESTEROL: 78 MG/DL (CALC)
LYMPHOCYTES: 27.1 %
MCH: 28.7 PG (ref 27–33)
MCHC: 33.6 G/DL (ref 32–36)
MCV: 85.5 FL (ref 80–100)
MONOCYTES: 7.4 %
MPV: 12.1 FL (ref 7.5–12.5)
NEUTROPHILS: 61.2 %
NON-HDL CHOLESTEROL: 102 MG/DL (CALC)
PLATELET COUNT: 194 THOUSAND/UL (ref 140–400)
POTASSIUM: 4.3 MMOL/L (ref 3.5–5.3)
PROTEIN, TOTAL: 7.4 G/DL (ref 6.1–8.1)
PSA, TOTAL: 0.98 NG/ML
RDW: 13 % (ref 11–15)
RED BLOOD CELL COUNT: 5.78 MILLION/UL (ref 4.2–5.8)
SODIUM: 143 MMOL/L (ref 135–146)
TRIGLYCERIDES: 147 MG/DL
WHITE BLOOD CELL COUNT: 5.6 THOUSAND/UL (ref 3.8–10.8)

## 2022-04-19 ENCOUNTER — IMMUNIZATION (OUTPATIENT)
Dept: LAB | Age: 67
End: 2022-04-19
Attending: EMERGENCY MEDICINE
Payer: MEDICARE

## 2022-04-19 DIAGNOSIS — Z23 NEED FOR VACCINATION: Primary | ICD-10-CM

## 2022-04-19 PROCEDURE — 0054A SARSCOV2 VAC 30MCG TRS SUCR: CPT

## 2022-04-20 ENCOUNTER — TELEPHONE (OUTPATIENT)
Dept: INTERNAL MEDICINE CLINIC | Facility: CLINIC | Age: 67
End: 2022-04-20

## 2022-04-20 NOTE — TELEPHONE ENCOUNTER
Patient sent a message thru scheduling reguesting Pneumococccal vaccine, 2nd part . If an order can please be entered and then he'll schedule a nurse appointment.

## 2022-04-22 ENCOUNTER — NURSE ONLY (OUTPATIENT)
Dept: INTERNAL MEDICINE CLINIC | Facility: CLINIC | Age: 67
End: 2022-04-22
Payer: COMMERCIAL

## 2022-04-22 PROCEDURE — G0009 ADMIN PNEUMOCOCCAL VACCINE: HCPCS | Performed by: INTERNAL MEDICINE

## 2022-04-22 PROCEDURE — 90677 PCV20 VACCINE IM: CPT | Performed by: INTERNAL MEDICINE

## 2022-09-22 ENCOUNTER — IMMUNIZATION (OUTPATIENT)
Dept: LAB | Age: 67
End: 2022-09-22
Attending: EMERGENCY MEDICINE

## 2022-09-22 DIAGNOSIS — Z23 NEED FOR VACCINATION: Primary | ICD-10-CM

## 2022-09-22 PROCEDURE — 0124A SARSCOV2 VAC BVL 30MCG/0.3ML: CPT

## 2022-10-17 ENCOUNTER — IMMUNIZATION (OUTPATIENT)
Dept: LAB | Age: 67
End: 2022-10-17
Attending: EMERGENCY MEDICINE
Payer: MEDICARE

## 2022-10-17 DIAGNOSIS — Z23 NEED FOR VACCINATION: Primary | ICD-10-CM

## 2022-10-17 PROCEDURE — 90662 IIV NO PRSV INCREASED AG IM: CPT

## 2022-10-17 PROCEDURE — 90471 IMMUNIZATION ADMIN: CPT

## 2023-01-06 RX ORDER — FENOFIBRATE 160 MG/1
TABLET ORAL
Qty: 90 TABLET | Refills: 3 | Status: SHIPPED | OUTPATIENT
Start: 2023-01-06

## 2023-02-09 ENCOUNTER — OFFICE VISIT (OUTPATIENT)
Dept: INTERNAL MEDICINE CLINIC | Facility: CLINIC | Age: 68
End: 2023-02-09
Payer: COMMERCIAL

## 2023-02-09 VITALS
WEIGHT: 220 LBS | BODY MASS INDEX: 29.8 KG/M2 | DIASTOLIC BLOOD PRESSURE: 98 MMHG | SYSTOLIC BLOOD PRESSURE: 160 MMHG | HEIGHT: 72 IN

## 2023-02-09 DIAGNOSIS — Z00.00 MEDICARE ANNUAL WELLNESS VISIT, SUBSEQUENT: Primary | ICD-10-CM

## 2023-02-09 DIAGNOSIS — Z12.5 SCREENING FOR PROSTATE CANCER: ICD-10-CM

## 2023-02-09 DIAGNOSIS — D12.4 ADENOMATOUS POLYP OF DESCENDING COLON: ICD-10-CM

## 2023-02-09 DIAGNOSIS — I10 ESSENTIAL HYPERTENSION: ICD-10-CM

## 2023-02-09 DIAGNOSIS — E78.1 PURE HYPERGLYCERIDEMIA: ICD-10-CM

## 2023-02-10 LAB
ABSOLUTE BASOPHILS: 52 CELLS/UL (ref 0–200)
ABSOLUTE EOSINOPHILS: 132 CELLS/UL (ref 15–500)
ABSOLUTE LYMPHOCYTES: 1495 CELLS/UL (ref 850–3900)
ABSOLUTE MONOCYTES: 301 CELLS/UL (ref 200–950)
ABSOLUTE NEUTROPHILS: 2721 CELLS/UL (ref 1500–7800)
ALBUMIN/GLOBULIN RATIO: 1.9 (CALC) (ref 1–2.5)
ALBUMIN: 4.8 G/DL (ref 3.6–5.1)
ALKALINE PHOSPHATASE: 38 U/L (ref 35–144)
ALT: 28 U/L (ref 9–46)
AST: 26 U/L (ref 10–35)
BASOPHILS: 1.1 %
BILIRUBIN, TOTAL: 1.1 MG/DL (ref 0.2–1.2)
BUN: 19 MG/DL (ref 7–25)
CALCIUM: 10.1 MG/DL (ref 8.6–10.3)
CARBON DIOXIDE: 26 MMOL/L (ref 20–32)
CHLORIDE: 106 MMOL/L (ref 98–110)
CHOL/HDLC RATIO: 3.9 (CALC)
CHOLESTEROL, TOTAL: 139 MG/DL
CREATININE: 1.13 MG/DL (ref 0.7–1.35)
EGFR: 70 ML/MIN/1.73M2
EOSINOPHILS: 2.8 %
GLOBULIN: 2.5 G/DL (CALC) (ref 1.9–3.7)
GLUCOSE: 102 MG/DL (ref 65–99)
HDL CHOLESTEROL: 36 MG/DL
HEMATOCRIT: 48.6 % (ref 38.5–50)
HEMOGLOBIN A1C: 5.3 % OF TOTAL HGB
HEMOGLOBIN: 16.4 G/DL (ref 13.2–17.1)
LDL-CHOLESTEROL: 78 MG/DL (CALC)
LYMPHOCYTES: 31.8 %
MCH: 28.9 PG (ref 27–33)
MCHC: 33.7 G/DL (ref 32–36)
MCV: 85.6 FL (ref 80–100)
MONOCYTES: 6.4 %
MPV: 12.3 FL (ref 7.5–12.5)
NEUTROPHILS: 57.9 %
NON-HDL CHOLESTEROL: 103 MG/DL (CALC)
PLATELET COUNT: 181 THOUSAND/UL (ref 140–400)
POTASSIUM: 4.3 MMOL/L (ref 3.5–5.3)
PROTEIN, TOTAL: 7.3 G/DL (ref 6.1–8.1)
PSA, TOTAL: 0.95 NG/ML
RDW: 13 % (ref 11–15)
RED BLOOD CELL COUNT: 5.68 MILLION/UL (ref 4.2–5.8)
SODIUM: 141 MMOL/L (ref 135–146)
TRIGLYCERIDES: 151 MG/DL
WHITE BLOOD CELL COUNT: 4.7 THOUSAND/UL (ref 3.8–10.8)

## 2023-03-31 RX ORDER — LISINOPRIL 40 MG/1
TABLET ORAL
Qty: 90 TABLET | Refills: 3 | Status: SHIPPED | OUTPATIENT
Start: 2023-03-31

## 2023-04-03 RX ORDER — METOPROLOL SUCCINATE 50 MG/1
TABLET, EXTENDED RELEASE ORAL
Qty: 90 TABLET | Refills: 3 | Status: SHIPPED | OUTPATIENT
Start: 2023-04-03

## 2023-09-20 ENCOUNTER — TELEPHONE (OUTPATIENT)
Facility: CLINIC | Age: 68
End: 2023-09-20

## 2023-09-20 DIAGNOSIS — Z12.11 SPECIAL SCREENING FOR MALIGNANT NEOPLASMS, COLON: Primary | ICD-10-CM

## 2023-09-20 DIAGNOSIS — Z86.010 HISTORY OF ADENOMATOUS POLYP OF COLON: ICD-10-CM

## 2023-09-22 NOTE — TELEPHONE ENCOUNTER
Ok to schedule colonoscopy with MAC with split golytely for colorectal cancer screening and history of adenomatous colon polyp at [facility] or [facility] on a Friday and also IV eligible    Thanks    Selena Bhagat MD  [facility], [facility] - Gastroenterology

## 2023-09-27 NOTE — TELEPHONE ENCOUNTER
Scheduled for:  Colonoscopy [place]  Provider Name:  Dr. Carlota Bolaños  Date:  1/26/2024  Location:  [facility]  Sedation:  MAC  Time:  9:00am, (pt is aware to arrive at 8am)   Prep:  Golytely  Meds/Allergies Reconciled?:  Physician reviewed     Diagnosis with codes:  Colon Cancer screen Z12.11, hx of adenomatous colon polyp Z86.010       Was patient informed to call insurance with codes (Y/N):  Yes, I confirmed  MEDICARE insurance with this patient. Referral sent?:  Referral was sent at the time of electronic surgical scheduling. [facility] or [facility]  notified?:  I sent an electronic request to Endo Scheduling and received a confirmation today.       Medication Orders:  n/a  Misc Orders:  n/a     Further instructions given by staff:   I discussed the prep instructions with the patient which he verbally understood and is aware that I will send the instructions via [employer]

## 2023-10-26 ENCOUNTER — IMMUNIZATION (OUTPATIENT)
Dept: LAB | Age: 68
End: 2023-10-26
Attending: EMERGENCY MEDICINE

## 2023-10-26 DIAGNOSIS — Z23 NEED FOR VACCINATION: Primary | ICD-10-CM

## 2023-10-26 PROCEDURE — 90480 ADMN SARSCOV2 VAC 1/ONLY CMP: CPT

## 2023-11-27 ENCOUNTER — OFFICE VISIT (OUTPATIENT)
Dept: INTERNAL MEDICINE CLINIC | Facility: CLINIC | Age: 68
End: 2023-11-27
Payer: COMMERCIAL

## 2023-11-27 VITALS
SYSTOLIC BLOOD PRESSURE: 160 MMHG | DIASTOLIC BLOOD PRESSURE: 90 MMHG | OXYGEN SATURATION: 96 % | HEART RATE: 76 BPM | WEIGHT: 218 LBS | HEIGHT: 72 IN | BODY MASS INDEX: 29.53 KG/M2

## 2023-11-27 DIAGNOSIS — R09.1 PLEURISY: Primary | ICD-10-CM

## 2023-11-27 DIAGNOSIS — I10 ESSENTIAL HYPERTENSION: ICD-10-CM

## 2023-11-27 PROCEDURE — 3080F DIAST BP >= 90 MM HG: CPT | Performed by: INTERNAL MEDICINE

## 2023-11-27 PROCEDURE — 3077F SYST BP >= 140 MM HG: CPT | Performed by: INTERNAL MEDICINE

## 2023-11-27 PROCEDURE — 3008F BODY MASS INDEX DOCD: CPT | Performed by: INTERNAL MEDICINE

## 2023-11-27 PROCEDURE — 99214 OFFICE O/P EST MOD 30 MIN: CPT | Performed by: INTERNAL MEDICINE

## 2023-11-27 PROCEDURE — 1159F MED LIST DOCD IN RCRD: CPT | Performed by: INTERNAL MEDICINE

## 2023-11-27 RX ORDER — METOPROLOL SUCCINATE 100 MG/1
100 TABLET, EXTENDED RELEASE ORAL DAILY
Qty: 90 TABLET | Refills: 3 | Status: SHIPPED | OUTPATIENT
Start: 2023-11-27

## 2023-11-27 RX ORDER — AMOXICILLIN 500 MG/1
500 CAPSULE ORAL 3 TIMES DAILY
Qty: 30 CAPSULE | Refills: 0 | Status: SHIPPED | OUTPATIENT
Start: 2023-11-27 | End: 2023-12-07

## 2023-12-10 ENCOUNTER — IMMUNIZATION (OUTPATIENT)
Dept: FAMILY MEDICINE CLINIC | Facility: CLINIC | Age: 68
End: 2023-12-10
Payer: COMMERCIAL

## 2023-12-10 DIAGNOSIS — Z23 NEED FOR INFLUENZA VACCINATION: Primary | ICD-10-CM

## 2023-12-10 PROCEDURE — 1160F RVW MEDS BY RX/DR IN RCRD: CPT | Performed by: PHYSICIAN ASSISTANT

## 2023-12-10 PROCEDURE — 1159F MED LIST DOCD IN RCRD: CPT | Performed by: PHYSICIAN ASSISTANT

## 2023-12-10 PROCEDURE — G0008 ADMIN INFLUENZA VIRUS VAC: HCPCS | Performed by: PHYSICIAN ASSISTANT

## 2023-12-10 PROCEDURE — 90662 IIV NO PRSV INCREASED AG IM: CPT | Performed by: PHYSICIAN ASSISTANT

## 2024-01-22 RX ORDER — FENOFIBRATE 160 MG/1
160 TABLET ORAL DAILY
Qty: 90 TABLET | Refills: 3 | Status: SHIPPED | OUTPATIENT
Start: 2024-01-22

## 2024-01-26 ENCOUNTER — HOSPITAL ENCOUNTER (OUTPATIENT)
Age: 69
Setting detail: HOSPITAL OUTPATIENT SURGERY
Discharge: HOME | End: 2024-01-26
Attending: INTERNAL MEDICINE | Admitting: INTERNAL MEDICINE
Payer: MEDICARE

## 2024-01-26 ENCOUNTER — ANESTHESIA EVENT (OUTPATIENT)
Dept: ENDOSCOPY | Age: 69
End: 2024-01-26
Payer: MEDICARE

## 2024-01-26 ENCOUNTER — ANESTHESIA (OUTPATIENT)
Dept: ENDOSCOPY | Age: 69
End: 2024-01-26
Payer: MEDICARE

## 2024-01-26 VITALS
BODY MASS INDEX: 29.12 KG/M2 | WEIGHT: 215 LBS | RESPIRATION RATE: 18 BRPM | DIASTOLIC BLOOD PRESSURE: 85 MMHG | OXYGEN SATURATION: 98 % | TEMPERATURE: 98 F | SYSTOLIC BLOOD PRESSURE: 151 MMHG | HEIGHT: 72 IN | HEART RATE: 59 BPM

## 2024-01-26 DIAGNOSIS — Z12.11 SPECIAL SCREENING FOR MALIGNANT NEOPLASMS, COLON: ICD-10-CM

## 2024-01-26 DIAGNOSIS — Z86.010 HISTORY OF ADENOMATOUS POLYP OF COLON: ICD-10-CM

## 2024-01-26 PROCEDURE — 99070 SPECIAL SUPPLIES PHYS/QHP: CPT | Performed by: INTERNAL MEDICINE

## 2024-01-26 PROCEDURE — 45380 COLONOSCOPY AND BIOPSY: CPT | Performed by: INTERNAL MEDICINE

## 2024-01-26 PROCEDURE — 45385 COLONOSCOPY W/LESION REMOVAL: CPT | Performed by: INTERNAL MEDICINE

## 2024-01-26 PROCEDURE — 88305 TISSUE EXAM BY PATHOLOGIST: CPT | Performed by: INTERNAL MEDICINE

## 2024-01-26 RX ORDER — LIDOCAINE HYDROCHLORIDE 10 MG/ML
INJECTION, SOLUTION EPIDURAL; INFILTRATION; INTRACAUDAL; PERINEURAL AS NEEDED
Status: DISCONTINUED | OUTPATIENT
Start: 2024-01-26 | End: 2024-01-26 | Stop reason: SURG

## 2024-01-26 RX ORDER — SODIUM CHLORIDE, SODIUM LACTATE, POTASSIUM CHLORIDE, CALCIUM CHLORIDE 600; 310; 30; 20 MG/100ML; MG/100ML; MG/100ML; MG/100ML
INJECTION, SOLUTION INTRAVENOUS CONTINUOUS
Status: DISCONTINUED | OUTPATIENT
Start: 2024-01-26 | End: 2024-01-26

## 2024-01-26 RX ADMIN — LIDOCAINE HYDROCHLORIDE 25 MG: 10 INJECTION, SOLUTION EPIDURAL; INFILTRATION; INTRACAUDAL; PERINEURAL at 08:50:00

## 2024-01-26 RX ADMIN — SODIUM CHLORIDE, SODIUM LACTATE, POTASSIUM CHLORIDE, CALCIUM CHLORIDE: 600; 310; 30; 20 INJECTION, SOLUTION INTRAVENOUS at 09:16:00

## 2024-01-26 RX ADMIN — SODIUM CHLORIDE, SODIUM LACTATE, POTASSIUM CHLORIDE, CALCIUM CHLORIDE: 600; 310; 30; 20 INJECTION, SOLUTION INTRAVENOUS at 08:47:00

## 2024-01-26 NOTE — ANESTHESIA POSTPROCEDURE EVALUATION
Patient: Toy Acosta    Procedure Summary       Date: 01/26/24 Room / Location: [facility] ENDOSCOPY 01 / [facility] ENDO    Anesthesia Start: 0847 Anesthesia Stop: 0917    Procedure: COLONOSCOPY Diagnosis:       Special screening for malignant neoplasms, colon      History of adenomatous polyp of colon      (diverticulosis,)    Surgeons: Edu Tate MD Anesthesiologist:     Anesthesia Type: general, MAC ASA Status: 2            Anesthesia Type: general, MAC    Vitals Value Taken Time   /75 01/26/24 0917   Temp 98 °F (36.7 °C) 01/26/24 0917   Pulse 54 01/26/24 0917   Resp 18 01/26/24 0917   SpO2 97 % 01/26/24 0917       [facility] AN Post Evaluation:   Patient Evaluated in PACU  Patient Participation: complete - patient participated  Level of Consciousness: sleepy but conscious  Pain Score: 0  Pain Management: adequate  Airway Patency:patent  Dental exam unchanged from preop  Yes    Cardiovascular Status: acceptable  Respiratory Status: acceptable  Postoperative Hydration acceptable      Tanya Malin CRNA  1/26/2024 9:17 AM

## 2024-01-26 NOTE — OPERATIVE REPORT
Colonoscopy Report    Toy Acosta     1955 Age 68 year old   PCP Rachel Jin MD Endoscopist Edu Tate MD     Date of procedure: 24    Procedure: Colonoscopy w/ biopsy and snare polypectomy    Pre-operative diagnosis: colorectal cancer screening, history of adenomatous colon polyp     Last colonoscopy in 2018 for colorectal cancer screening with 5 diminutive polyps removed of which 2 were adenomatous    Post-operative diagnosis: colon and rectal polyps, diverticulosis, hemorrhoids    Sedation: monitored anesthesia care (MAC)    Consent: We discussed the risks/benefits and alternatives to this procedure, as well as the planned sedation. Informed consent was obtained from the patient after the risks of the procedure were discussed, including but not limited to bleeding, perforation, aspiration, infection, or possibility of a missed lesion as well as the risks of anesthesia including but not limited to cardiopulmonary complications. The patient signed informed consent and elected to proceed with Colonoscopy with intervention [i.e. Biopsy, control of bleeding, dilatation, polypectomy, endoscopic mucosal resection, etc.] as indicated.    Colonoscopy procedure: Once an adequate level of sedation was obtained a digital rectal exam was completed revealing normal tone and no masses palpated. Then the lubricated tip of the [id number] diagnostic video colonoscope was carefully inserted and advanced without difficulty to the cecum using the air insufflation technique (only Co2 was used for insufflation). The cecum was identified by localizing the trifold, the appendix and the ileocecal valve. Withdrawal was begun with thorough washing and careful examination of the colonic walls and folds. The ascending colon was viewed twice in the forward view. Photodocumentation was obtained. The bowel prep was good. Views of the colon were good with washing. Withdrawal time was 18  minutes.    Air was then withdrawn and the endoscope was removed. The patient tolerated the procedure well. There were no immediate postoperative complications. The patient’s vital signs were monitored throughout the procedure and remained stable.    Estimated blood loss: insignificant    Specimens collected:  colon and rectal polyps    Complications: none     Colonoscopy findings:    Cecum: normal mucosa and vascular pattern    Ascending colon: there were two ascending colon polyps measuring 2-3 mm each and both removed by cold biopsy forceps. There were two polyps at the hepatic flexure. One polyp was 2-3 mm in size and removed by cold biopsy forceps. There was a 5 mm polyp removed by cold snare polypectomy. Otherwise, normal mucosa and vascular pattern    Transverse colon: there were two polyps measuring 3 mm each and both removed by cold biopsy forceps. Otherwise, normal mucosa and vascular pattern    Descending colon: normal mucosa and vascular pattern    Sigmoid colon: normal mucosa and vascular pattern    Rectum: retroflexed view showed small non-bleeding hemorrhoids. There were two 2-3 mm polyps both removed by cold biopsy forceps. Otherwise, normal mucosa and vascular pattern.    There were scattered diverticulosis throughout the colon.    Impression:  1. 8 diminutive colorectal polyps removed  2. Diverticulosis  3. Hemorrhoids  4. Otherwise, unremarkable colonoscopy    Recommend:  1. Await pathology.   2. Repeat colonoscopy interval pending final pathology results. If new signs or symptoms develop, procedure may need to be repeated sooner.   3. Continue your current medications  4. Increase fiber in the diet  5. Follow up with your primary care physician on a routine basis    >>>If biopsies were performed and you have not received your pathology results either by phone or letter within 2 weeks, please call our office at [phone].    MD Channing [facility]  [facility] - Gastroenterology  1/26/2024

## 2024-01-26 NOTE — H&P
History & Physical Examination    Patient Name: Toy Acosta  MRN: [id number]  CSN: [id number]  YOB: 1955    Diagnosis: colorectal cancer screening, history of adenomatous colon polyp    Last colonoscopy in 2018 for colorectal cancer screening with 5 diminutive polyps removed of which 2 were adenomatous      Medications Prior to Admission   Medication Sig Dispense Refill Last Dose    metoprolol succinate  MG Oral Tablet 24 Hr Take 1 tablet (100 mg total) by mouth daily. 90 tablet 3 2024    LISINOPRIL 40 MG Oral Tab TAKE 1 TABLET BY MOUTH EVERY DAY 90 tablet 3 2024    Fenofibrate 160 MG Oral Tab Take 1 tablet (160 mg total) by mouth daily. 90 tablet 3 2024    [] amoxicillin 500 MG Oral Cap Take 1 capsule (500 mg total) by mouth 3 (three) times daily for 10 days. (Patient not taking: Reported on 2024) 30 capsule 0 Not Taking     Current Facility-Administered Medications   Medication Dose Route Frequency    lactated ringers infusion   Intravenous Continuous       Allergies: No Known Allergies    Past Medical History:   Diagnosis Date    Adopted     Anxiety 2014    Colon adenomas 2018    Colon polyp 2008    benign    Diverticulosis 2008    Lt sided    High blood pressure     High cholesterol     HTN (hypertension) 2014    Hyperlipidemia 10/2013    Hypertriglyceridemia 2014    Internal hemorrhoids 2008    Right shoulder tendonitis 2012     Past Surgical History:   Procedure Laterality Date    COLONOSCOPY      COLONOSCOPY  2018    COLONOSCOPY N/A 12/3/2018    Procedure: COLONOSCOPY;  Surgeon: Edu Tate MD;  Location: [facility] ENDOSCOPY    COLONOSCOPY & POLYPECTOMY  2008     History reviewed. No pertinent family history.  Social History     Tobacco Use    Smoking status: Never    Smokeless tobacco: Never   Substance Use Topics    Alcohol use: No       SYSTEM Check if Physical Exam is Normal If not normal, please explain:    HEENT [ X]    NECK  [ X]    HEART [ X]    LUNGS [ X]    ABDOMEN [ X]    EXTREMITIES [ X]      I have discussed the risks and benefits and alternatives of the procedure with the patient/family.  He understood and agreed to proceed with plan of care.   I have reviewed the History and Physical done within the last 30 days.  Any changes noted above.  Edu Tate MD  [facility] - Gastroenterology  1/26/2024  8:42 AM

## 2024-01-26 NOTE — H&P
History & Physical Examination    Patient Name: Toy Acosta  MRN: [id number]  CSN: [id number]  YOB: 1955    Diagnosis: colorectal cancer screening    Medications Prior to Admission   Medication Sig Dispense Refill Last Dose    metoprolol succinate  MG Oral Tablet 24 Hr Take 1 tablet (100 mg total) by mouth daily. 90 tablet 3 2024    LISINOPRIL 40 MG Oral Tab TAKE 1 TABLET BY MOUTH EVERY DAY 90 tablet 3 2024    Fenofibrate 160 MG Oral Tab Take 1 tablet (160 mg total) by mouth daily. 90 tablet 3 2024    [] amoxicillin 500 MG Oral Cap Take 1 capsule (500 mg total) by mouth 3 (three) times daily for 10 days. (Patient not taking: Reported on 2024) 30 capsule 0 Not Taking     Current Facility-Administered Medications   Medication Dose Route Frequency    lactated ringers infusion   Intravenous Continuous       Allergies: No Known Allergies    Past Medical History:   Diagnosis Date    Adopted     Anxiety 2014    Colon adenomas 2018    Colon polyp 2008    benign    Diverticulosis 2008    Lt sided    High blood pressure     High cholesterol     HTN (hypertension) 2014    Hyperlipidemia 10/2013    Hypertriglyceridemia 2014    Internal hemorrhoids 2008    Right shoulder tendonitis 2012     Past Surgical History:   Procedure Laterality Date    COLONOSCOPY      COLONOSCOPY  2018    COLONOSCOPY N/A 12/3/2018    Procedure: COLONOSCOPY;  Surgeon: Edu Tate MD;  Location: [facility] ENDOSCOPY    COLONOSCOPY & POLYPECTOMY  2008     History reviewed. No pertinent family history.  Social History     Tobacco Use    Smoking status: Never    Smokeless tobacco: Never   Substance Use Topics    Alcohol use: No       SYSTEM Check if Physical Exam is Normal If not normal, please explain:   HEENT [ X]    NECK  [ X]    HEART [ X]    LUNGS [ X]    ABDOMEN [ X]    EXTREMITIES [ X]      I have discussed the risks and benefits and alternatives of the  procedure with the patient/family.  They understood and agreed to proceed with plan of care.   I have reviewed the History and Physical done within the last 30 days.  Any changes noted above.  Edu Tate MD  [facility] - Gastroenterology  1/26/2024  9:25 AM

## 2024-01-26 NOTE — ANESTHESIA PREPROCEDURE EVALUATION
Anesthesia PreOp Note    HPI:     Toy Acosta is a 68 year old male who presents for preoperative consultation requested by: Edu Tate MD    Date of Surgery: 2024    Procedure(s):  COLONOSCOPY  Indication: Special screening for malignant neoplasms, colon / History of adenomatous polyp of colon    Relevant Problems   No relevant active problems       NPO:  Last Liquid Consumption Date: 24  Last Liquid Consumption Time: 0500  Last Solid Consumption Date: 24  Last Solid Consumption Time: 0900  Last Liquid Consumption Date: 24          History Review:  Patient Active Problem List    Diagnosis Date Noted    Screening for colorectal cancer     Preoperative clearance 2015    HTN (hypertension) 2014    Hypertriglyceridemia 2014    Hyperlipidemia 10/01/2013       Past Medical History:   Diagnosis Date    Adopted     Anxiety 2014    Colon adenomas 2018    Colon polyp 2008    benign    Diverticulosis 2008    Lt sided    High blood pressure     High cholesterol     HTN (hypertension) 2014    Hyperlipidemia 10/2013    Hypertriglyceridemia 2014    Internal hemorrhoids 2008    Right shoulder tendonitis 2012       Past Surgical History:   Procedure Laterality Date    COLONOSCOPY      COLONOSCOPY  2018    COLONOSCOPY N/A 12/3/2018    Procedure: COLONOSCOPY;  Surgeon: Edu Tate MD;  Location: [facility] ENDOSCOPY    COLONOSCOPY & POLYPECTOMY  2008       Medications Prior to Admission   Medication Sig Dispense Refill Last Dose    metoprolol succinate  MG Oral Tablet 24 Hr Take 1 tablet (100 mg total) by mouth daily. 90 tablet 3 2024    LISINOPRIL 40 MG Oral Tab TAKE 1 TABLET BY MOUTH EVERY DAY 90 tablet 3 2024    Fenofibrate 160 MG Oral Tab Take 1 tablet (160 mg total) by mouth daily. 90 tablet 3 2024    [] amoxicillin 500 MG Oral Cap Take 1 capsule (500 mg total) by mouth 3 (three) times daily for 10 days.  (Patient not taking: Reported on 1/17/2024) 30 capsule 0 Not Taking     Current Facility-Administered Medications Ordered in Epic   Medication Dose Route Frequency Provider Last Rate Last Admin    lactated ringers infusion   Intravenous Continuous Edu Tate MD         No current [facility]-ordered outpatient medications on file.       No Known Allergies    History reviewed. No pertinent family history.  Social History     Socioeconomic History    Marital status: Single   Tobacco Use    Smoking status: Never    Smokeless tobacco: Never   Vaping Use    Vaping Use: Never used   Substance and Sexual Activity    Alcohol use: No    Drug use: No   Other Topics Concern    Caffeine Concern Yes     Comment: 1 cup of coffee daily    Exercise Yes     Comment: walking weather permitting 4-5 x wkly       Available pre-op labs reviewed.             Vital Signs:  Body mass index is 29.16 kg/m².   height is 1.829 m (6') and weight is 97.5 kg (215 lb). His blood pressure is 168/98 (abnormal) and his pulse is 69. His respiration is 11 and oxygen saturation is 98%.   Vitals:    01/17/24 1117 01/26/24 0801   BP:  (!) 168/98   Pulse:  69   Resp:  11   SpO2:  98%   Weight: 97.5 kg (215 lb) 97.5 kg (215 lb)   Height: 1.829 m (6') 1.829 m (6')        Anesthesia Evaluation     Patient summary reviewed and Nursing notes reviewed    Airway   Mallampati: II  TM distance: >3 FB  Neck ROM: full  Dental - Dentition appears grossly intact     Pulmonary - normal exam   Cardiovascular - normal exam  Exercise tolerance: good  (+) hypertension well controlled    Neuro/Psych    (+)  anxiety/panic attacks,        GI/Hepatic/Renal    (+) bowel prep    Endo/Other - negative ROS   Abdominal  - normal exam                 Anesthesia Plan:   ASA:  2  Plan:   General and MAC  Informed Consent Plan and Risks Discussed With:  Patient  Discussed plan with:  CRNA and surgeon      I have informed Toy Acosta and/or legal guardian or family member of the  nature of the anesthetic plan, benefits, risks including possible dental damage if relevant, major complications, and any alternative forms of anesthetic management.   All of the patient's questions were answered to the best of my ability. The patient desires the anesthetic management as planned.  Tanya Malin CRNA  1/26/2024 8:43 AM  Present on Admission:  **None**

## 2024-01-26 NOTE — DISCHARGE INSTRUCTIONS
Home Care Instructions for Colonoscopy  Diet:  - Resume your regular diet as tolerated unless otherwise instructed.  - Start with light meals to minimize bloating.  - Do not drink alcohol today.    Medication:  - If you have questions about resuming your normal medications, please contact your Primary Care Physician.    Activities:  - Take it easy today. Do not return to work today.  - Do not drive today.  - Do not operate any machinery today (including kitchen equipment).    Colonoscopy:  - You may notice some rectal \"spotting\" (a little blood on the toilet tissue) for a day or two after the exam. This is normal.  - If you experience any rectal bleeding (not spotting), persistent tenderness or sharp severe abdominal pains, oral temperature over 100 degrees Fahrenheit, light-headedness or dizziness, or any other problems, contact your doctor.      **If unable to reach your doctor, please go to the [facility] Emergency Room**    - Your referring physician will receive a full report of your examination.  - If you do not hear from your doctor's office within two weeks of your biopsy, please call them for your results.    You may be able to see your laboratory results in valuklik between 4 and 7 business days.  In some cases, your physician may not have viewed the results before they are released to valuklik.  If you have questions regarding your results contact the physician who ordered the test/exam by phone or via valuklik by choosing \"Ask a Medical Question.\"

## 2024-01-30 NOTE — PROGRESS NOTES
GI staff: please place recall for colonoscopy in 3 years     Thanks    Edu Tate MD  [facility] - Gastroenterology  1/30/2024  10:26 AM

## 2024-01-31 ENCOUNTER — TELEPHONE (OUTPATIENT)
Facility: CLINIC | Age: 69
End: 2024-01-31

## 2024-01-31 NOTE — TELEPHONE ENCOUNTER
Recall colonoscopy in 3 years per Dr Tate.    Colon done 1/26/2024    Health maintenance updated and message sent to patient outreach to repeat colonoscopy in 3 years    Results seen by patient via mychart  Seen by patient Toy Acosta on 1/30/2024  5:56 PM

## 2024-01-31 NOTE — TELEPHONE ENCOUNTER
----- Message from Edu Tate MD sent at 1/30/2024 10:27 AM CST -----  GI staff: please place recall for colonoscopy in 3 years     Thanks    Edu Tate MD  [facility] - Gastroenterology  1/30/2024  10:26 AM

## 2024-02-08 ENCOUNTER — OFFICE VISIT (OUTPATIENT)
Dept: INTERNAL MEDICINE CLINIC | Facility: CLINIC | Age: 69
End: 2024-02-08
Payer: COMMERCIAL

## 2024-02-08 VITALS
OXYGEN SATURATION: 97 % | BODY MASS INDEX: 29.26 KG/M2 | HEART RATE: 67 BPM | WEIGHT: 216 LBS | HEIGHT: 72 IN | SYSTOLIC BLOOD PRESSURE: 140 MMHG | DIASTOLIC BLOOD PRESSURE: 80 MMHG

## 2024-02-08 DIAGNOSIS — I10 PRIMARY HYPERTENSION: ICD-10-CM

## 2024-02-08 DIAGNOSIS — Z00.00 MEDICARE ANNUAL WELLNESS VISIT, SUBSEQUENT: Primary | ICD-10-CM

## 2024-02-08 DIAGNOSIS — Z12.5 PROSTATE CANCER SCREENING: ICD-10-CM

## 2024-02-08 DIAGNOSIS — E78.2 MIXED HYPERLIPIDEMIA: ICD-10-CM

## 2024-02-08 PROCEDURE — 3077F SYST BP >= 140 MM HG: CPT | Performed by: INTERNAL MEDICINE

## 2024-02-08 PROCEDURE — 3008F BODY MASS INDEX DOCD: CPT | Performed by: INTERNAL MEDICINE

## 2024-02-08 PROCEDURE — 1125F AMNT PAIN NOTED PAIN PRSNT: CPT | Performed by: INTERNAL MEDICINE

## 2024-02-08 PROCEDURE — G0439 PPPS, SUBSEQ VISIT: HCPCS | Performed by: INTERNAL MEDICINE

## 2024-02-08 PROCEDURE — 1160F RVW MEDS BY RX/DR IN RCRD: CPT | Performed by: INTERNAL MEDICINE

## 2024-02-08 PROCEDURE — 96160 PT-FOCUSED HLTH RISK ASSMT: CPT | Performed by: INTERNAL MEDICINE

## 2024-02-08 PROCEDURE — 1159F MED LIST DOCD IN RCRD: CPT | Performed by: INTERNAL MEDICINE

## 2024-02-08 PROCEDURE — 3079F DIAST BP 80-89 MM HG: CPT | Performed by: INTERNAL MEDICINE

## 2024-02-08 PROCEDURE — 1170F FXNL STATUS ASSESSED: CPT | Performed by: INTERNAL MEDICINE

## 2024-02-08 NOTE — PROGRESS NOTES
Subjective:   Toy Acosta is a 69 year old male who presents for Physical     Patient here for MA supervisit and fu on htn after addition of Metoprolol at last apt. Also fu on high tryglicerides.  Had a recent colonoscopy- 3 year fu.  HPI:   Toy Acosta is a 69 year old male who presents for a MA Supervisit.    Patient Active Problem List   Diagnosis    HTN (hypertension)    Hypertriglyceridemia    Hyperlipidemia    Preoperative clearance    Screening for colorectal cancer       General Health     In the past six months, have you lost more than 10 pounds without trying?: 2 - No    Has your appetite been poor?: No    Type of Diet: Balanced;Low Salt;Low Carb    How does the patient maintain a good energy level?: Daily Walks    How would you describe your daily physical activity?: Light    How would you describe your current health state?: Good    How do you maintain positive mental well-being?: Games         Have you had any immunizations at another office such as Influenza, Hepatitis B, Tetanus, or Pneumococcal?: Yes     Functional Ability     Bathing or Showering: Able without help    Toileting: Able without help    Dressing: Able without help    Eating: Able without help    Driving: Able without help    Preparing your meals: Able without help    Managing money/bills: Able without help    Taking medications as prescribed: Able without help    Are you able to afford your medications?: Yes    Hearing Problems?: No     Functional Status     Hearing Problems?: No    Vision Problems? : No    Difficulty walking?: No    Difficulty dressing or bathing?: No    Problems with daily activities? : No    Memory Problems?: No      Fall/Risk Assessment                                                              Depression Screening (PHQ-2/PHQ-9): Over the LAST 2 WEEKS                      Advance Directives     Do you have a healthcare power of ?: Yes    Do you have a living will?: No   Was Medicare  Assessment Questionnaire completed by patient and sent to HIM for scanning?Yes    Please go to \"Cognitive Assessment\" under Medicare Assessment section in Charting, test patient and document.  .    Then, refresh your progress note to see your input here.  Cognitive Assessment     What day of the week is this?: Correct    What month is it?: Correct    What year is it?: Correct    Recall \"Ball\": Correct    Recall \"Flag\": Correct    Recall \"Tree\": Correct      ALLERGIES:   No Known Allergies    CURRENT MEDICATIONS:   Current Outpatient Medications   Medication Sig Dispense Refill    Fenofibrate 160 MG Oral Tab Take 1 tablet (160 mg total) by mouth daily. 90 tablet 3    metoprolol succinate  MG Oral Tablet 24 Hr Take 1 tablet (100 mg total) by mouth daily. 90 tablet 3    LISINOPRIL 40 MG Oral Tab TAKE 1 TABLET BY MOUTH EVERY DAY 90 tablet 3      MEDICAL INFORMATION:   Past Medical History:   Diagnosis Date    Adopted     Anxiety 03/2014    Colon adenomas 12/03/2018    Colon adenomas 01/26/2024    x6    Colon polyp 12/2008    benign    Diverticulosis 12/2008    Lt sided    High blood pressure     High cholesterol     HTN (hypertension) 03/2014    Hyperlipidemia 10/2013    Hypertriglyceridemia 03/2014    Internal hemorrhoids 12/2008    Right shoulder tendonitis 02/2012      Past Surgical History:   Procedure Laterality Date    COLONOSCOPY      COLONOSCOPY  12/03/2018    COLONOSCOPY N/A 12/3/2018    Procedure: COLONOSCOPY;  Surgeon: Edu Tate MD;  Location: [facility] ENDOSCOPY    COLONOSCOPY N/A 1/26/2024    Procedure: COLONOSCOPY WITH POLYPECTOMY;  Surgeon: Edu Tate MD;  Location: [facility] ENDO    COLONOSCOPY & POLYPECTOMY  12/1/2008      No family history on file.   SOCIAL HISTORY:   Social History     Socioeconomic History    Marital status: Single   Tobacco Use    Smoking status: Never    Smokeless tobacco: Never   Vaping Use    Vaping Use: Never used   Substance and Sexual Activity    Alcohol use: No     Drug use: No   Other Topics Concern    Caffeine Concern Yes     Comment: 1 cup of coffee daily    Exercise Yes     Comment: walking weather permitting 4-5 x wkly     Occ: retired : no     REVIEW OF SYSTEMS:   GENERAL: feels well otherwise  SKIN: denies any unusual skin lesions  EYES: denies blurred vision or double vision bifocal lenses  HEENT: denies nasal congestion, sinus pain or ST  LUNGS: denies shortness of breath with exertion  CARDIOVASCULAR: denies chest pain on exertion  GI: denies abdominal pain, denies heartburn  :1per night nocturia, no complaint of urinary incontinence  MUSCULOSKELETAL: denies back pain  NEURO: denies headaches  PSYCHE: denies depression or anxiety  HEMATOLOGIC: denies hx of anemia  ENDOCRINE: denies thyroid history  ALL/ASTHMA: denies hx of allergy or asthma    EXAM:   /80   Pulse 67   Ht 6' (1.829 m)   Wt 216 lb (98 kg)   SpO2 97%   BMI 29.29 kg/m²      >   Wt Readings from Last 6 Encounters:   02/08/24 216 lb (98 kg)   01/26/24 215 lb (97.5 kg)   11/27/23 218 lb (98.9 kg)   02/09/23 220 lb (99.8 kg)   02/11/22 220 lb (99.8 kg)   02/12/21 221 lb (100.2 kg)       >   BP Readings from Last 3 Encounters:   02/08/24 140/80   01/26/24 151/85   11/27/23 160/90       GENERAL: well developed, well nourished, in no apparent distress  SKIN: no rashes, no suspicious lesions  HEENT: atraumatic, normocephalic, ears and throat are clear  Hearing assessed via: Whispered Voice normal  EYES: PERRLA, EOMI, conjunctiva are clear  Right Eye Visual Acuity: Corrected Left Eye Visual Acuity: Corrected   Right Eye Chart Acuity: 20/25 Left Eye Chart Acuity: 20/25   NECK: supple, no adenopathy, no bruits  CHEST: no chest tenderness  BREAST: no masses or discharge no gynecomastia  LUNGS: clear to auscultation  CARDIO: RRR without murmur  GI: good BS's, no masses, HSM or tenderness  : deferred  RECTALdeferred  MUSCULOSKELETAL: back is not tender, FROM of the back  EXTREMITIES: no  cyanosis, clubbing or edema.  NEURO: Oriented times three, cranial nerves are intact, motor and sensory are grossly intact    ASSESSMENT AND OTHER RELEVANT CHRONIC CONDITIONS:   Toy Acosta is a 69 year old male who presents for a Medicare Assessment.     PLAN SUMMARY:   Diagnoses and all orders for this visit:    Medicare annual wellness visit, subsequent    Primary hypertension    Mixed hyperlipidemia         The patient indicates understanding of these issues and agrees to the plan.  The patient is asked to return in 12m for fu       PREVENTATIVE SERVICES  INDICATIONS AND SCHEDULE Internal Lab or Procedure External Lab or Procedure   Diabetes Screening      HbgA1C   Annually HEMOGLOBIN A1c (% of total Hgb)   Date Value   02/09/2023 5.3         No data to display                Fasting Blood Sugar (FSB)Annually No results found for: \"GLUCOSE\"    Cardiovascular Disease Screening     LDL Annually LDL-CHOLESTEROL (mg/dL (calc))   Date Value   02/09/2023 78        EKG One Time     Colorectal Cancer Screening      Colonoscopy Screen every 10 years Health Maintenance   Topic Date Due    Colorectal Cancer Screening  01/26/2027    Update Health Maintenance if applicable    Flex Sigmoidoscopy Screen every 10 years No results found for this or any previous visit.      No data to display                 Fecal Occult Blood Annually No results found for: \"FOB\"      No data to display                Glaucoma Screening      Ophthalmology Visit Annually     Prostate Cancer Screening      PSA  Annually Health Maintenance   Topic Date Due    PSA  02/09/2025     Update Health Maintenance if applicable   Immunizations      Influenza No orders found for this or any previous visit. Update Immunization Activity if applicable    Pneumococcal Orders placed or performed in visit on 02/12/21    Pneumococcal 23, Adult (Pneumovax) (92191) (Dx V03.82/Z23)    Update Immunization Activity if applicable    Hepatitis B No orders found for  this or any previous visit. Update Immunization Activity if applicable    Tetanus No orders found for this or any previous visit. Update Immunization Activity if applicable        SPECIFIC DISEASE MONITORING Internal Lab or Procedure External Lab or Procedure   Annual Monitoring of Persistent     Medications (ACE/ARB, digoxin diuretics, anticonvulsants.)    Potassium  Annually POTASSIUM (mmol/L)   Date Value   02/09/2023 4.3         No data to display                Creatinine  Annually CREATININE (mg/dL)   Date Value   02/09/2023 1.13         No data to display                BUN  Annually UREA NITROGEN (BUN) (mg/dL)   Date Value   02/09/2023 19         No data to display                 Drug Serum Conc  Annually No results found for: \"DIGOXIN\", \"DIG\", \"VALP\"      No data to display                Diabetes      HgbA1C  Annually HEMOGLOBIN A1c (% of total Hgb)   Date Value   02/09/2023 5.3         No data to display                Creat/alb ratio  Annually      LDL  Annually LDL-CHOLESTEROL (mg/dL (calc))   Date Value   02/09/2023 78         No data to display                 Dilated Eye exam  Annually      No data to display                   No data to display                COPD      Spirometry Testing Annually No results found for this or any previous visit.      No data to display                    SUGGESTED VACCINATIONS - Influenza, Pneumococcal, Zoster, Tetanus     Immunization History   Administered Date(s) Administered    Covid-19 Vaccine Pfizer 30 mcg/0.3 ml 03/16/2021, 04/06/2021, 10/06/2021    Covid-19 Vaccine Pfizer Bivalent 30mcg/0.3mL 09/22/2022    Covid-19 Vaccine Pfizer John-Sucrose 30 mcg/0.3 ml 04/19/2022    FLU VAC High Dose 65 YRS & Older PRSV Free (09678) 10/17/2022, 12/10/2023    Pfizer Covid-19 Vaccine 30mcg/0.3ml 12yrs+ ([id number]) 10/26/2023    Pneumococcal Conjugate PCV20 04/22/2022    Pneumovax 23 02/12/2021           History/Other:    Chief Complaint Reviewed and Verified  No Further  Nursing Notes to   Review  Tobacco Reviewed  Medications Reviewed         Tobacco:  He has never smoked tobacco.    Current Outpatient Medications   Medication Sig Dispense Refill    Fenofibrate 160 MG Oral Tab Take 1 tablet (160 mg total) by mouth daily. 90 tablet 3    metoprolol succinate  MG Oral Tablet 24 Hr Take 1 tablet (100 mg total) by mouth daily. 90 tablet 3    LISINOPRIL 40 MG Oral Tab TAKE 1 TABLET BY MOUTH EVERY DAY 90 tablet 3         Review of Systems:  Review of Systems      Objective:   /80   Pulse 67   Ht 6' (1.829 m)   Wt 216 lb (98 kg)   SpO2 97%   BMI 29.29 kg/m²  Estimated body mass index is 29.29 kg/m² as calculated from the following:    Height as of this encounter: 6' (1.829 m).    Weight as of this encounter: 216 lb (98 kg).  Physical Exam      Assessment & Plan:   1. Medicare annual wellness visit, subsequent (Primary) check fasting labs  2. Primary hypertension on lisinopril 40 mg and metoprolol 100mg  3. Mixed hyperlipidemia recheck panel - on fibrate rx        No follow-ups on file.    Rachel Jin MD, 2/8/2024, 9:53 AM

## 2024-02-09 LAB
ABSOLUTE BASOPHILS: 40 CELLS/UL (ref 0–200)
ABSOLUTE EOSINOPHILS: 170 CELLS/UL (ref 15–500)
ABSOLUTE LYMPHOCYTES: 1350 CELLS/UL (ref 850–3900)
ABSOLUTE MONOCYTES: 300 CELLS/UL (ref 200–950)
ABSOLUTE NEUTROPHILS: 3140 CELLS/UL (ref 1500–7800)
ALBUMIN/GLOBULIN RATIO: 1.7 (CALC) (ref 1–2.5)
ALBUMIN: 4.7 G/DL (ref 3.6–5.1)
ALKALINE PHOSPHATASE: 37 U/L (ref 35–144)
ALT: 26 U/L (ref 9–46)
AST: 25 U/L (ref 10–35)
BASOPHILS: 0.8 %
BILIRUBIN, TOTAL: 1 MG/DL (ref 0.2–1.2)
BUN: 24 MG/DL (ref 7–25)
CALCIUM: 10 MG/DL (ref 8.6–10.3)
CARBON DIOXIDE: 27 MMOL/L (ref 20–32)
CHLORIDE: 106 MMOL/L (ref 98–110)
CHOL/HDLC RATIO: 3.9 (CALC)
CHOLESTEROL, TOTAL: 150 MG/DL
CREATININE: 1.21 MG/DL (ref 0.7–1.28)
EGFR: 64 ML/MIN/1.73M2
EOSINOPHILS: 3.4 %
GLOBULIN: 2.7 G/DL (CALC) (ref 1.9–3.7)
GLUCOSE: 109 MG/DL (ref 65–99)
HDL CHOLESTEROL: 38 MG/DL
HEMATOCRIT: 49.8 % (ref 38.5–50)
HEMOGLOBIN: 16.6 G/DL (ref 13.2–17.1)
LDL-CHOLESTEROL: 86 MG/DL (CALC)
LYMPHOCYTES: 27 %
MCH: 29.2 PG (ref 27–33)
MCHC: 33.3 G/DL (ref 32–36)
MCV: 87.7 FL (ref 80–100)
MONOCYTES: 6 %
MPV: 12 FL (ref 7.5–12.5)
NEUTROPHILS: 62.8 %
NON-HDL CHOLESTEROL: 112 MG/DL (CALC)
PLATELET COUNT: 193 THOUSAND/UL (ref 140–400)
POTASSIUM: 4.3 MMOL/L (ref 3.5–5.3)
PROTEIN, TOTAL: 7.4 G/DL (ref 6.1–8.1)
PSA, TOTAL: 0.97 NG/ML
RDW: 13.1 % (ref 11–15)
RED BLOOD CELL COUNT: 5.68 MILLION/UL (ref 4.2–5.8)
SODIUM: 140 MMOL/L (ref 135–146)
TRIGLYCERIDES: 166 MG/DL
WHITE BLOOD CELL COUNT: 5 THOUSAND/UL (ref 3.8–10.8)

## 2024-04-17 RX ORDER — LISINOPRIL 40 MG/1
40 TABLET ORAL DAILY
Qty: 90 TABLET | Refills: 3 | Status: SHIPPED | OUTPATIENT
Start: 2024-04-17

## 2024-07-17 ENCOUNTER — IMMUNIZATION (OUTPATIENT)
Dept: LAB | Age: 69
End: 2024-07-17
Attending: EMERGENCY MEDICINE
Payer: MEDICARE

## 2024-07-17 DIAGNOSIS — Z23 NEED FOR VACCINATION: Primary | ICD-10-CM

## 2024-07-17 PROCEDURE — 90480 ADMN SARSCOV2 VAC 1/ONLY CMP: CPT

## 2024-10-12 ENCOUNTER — OFFICE VISIT (OUTPATIENT)
Dept: FAMILY MEDICINE CLINIC | Facility: CLINIC | Age: 69
End: 2024-10-12
Payer: COMMERCIAL

## 2024-10-12 DIAGNOSIS — Z23 NEED FOR INFLUENZA VACCINATION: Primary | ICD-10-CM

## 2024-10-12 PROCEDURE — 90662 IIV NO PRSV INCREASED AG IM: CPT | Performed by: NURSE PRACTITIONER

## 2024-10-12 PROCEDURE — 1159F MED LIST DOCD IN RCRD: CPT | Performed by: NURSE PRACTITIONER

## 2024-10-12 PROCEDURE — G0008 ADMIN INFLUENZA VIRUS VAC: HCPCS | Performed by: NURSE PRACTITIONER

## 2024-10-12 PROCEDURE — 1160F RVW MEDS BY RX/DR IN RCRD: CPT | Performed by: NURSE PRACTITIONER

## 2024-12-31 RX ORDER — FENOFIBRATE 160 MG/1
160 TABLET ORAL DAILY
Qty: 90 TABLET | Refills: 3 | Status: SHIPPED | OUTPATIENT
Start: 2024-12-31

## 2025-01-21 ENCOUNTER — OFFICE VISIT (OUTPATIENT)
Dept: INTERNAL MEDICINE CLINIC | Facility: CLINIC | Age: 70
End: 2025-01-21
Payer: COMMERCIAL

## 2025-01-21 ENCOUNTER — LAB ENCOUNTER (OUTPATIENT)
Dept: LAB | Age: 70
End: 2025-01-21
Attending: INTERNAL MEDICINE
Payer: MEDICARE

## 2025-01-21 VITALS
OXYGEN SATURATION: 97 % | WEIGHT: 215 LBS | HEIGHT: 72 IN | DIASTOLIC BLOOD PRESSURE: 88 MMHG | SYSTOLIC BLOOD PRESSURE: 130 MMHG | HEART RATE: 67 BPM | BODY MASS INDEX: 29.12 KG/M2

## 2025-01-21 DIAGNOSIS — Z00.00 MEDICARE ANNUAL WELLNESS VISIT, SUBSEQUENT: Primary | ICD-10-CM

## 2025-01-21 DIAGNOSIS — I10 PRIMARY HYPERTENSION: ICD-10-CM

## 2025-01-21 DIAGNOSIS — E78.1 HYPERTRIGLYCERIDEMIA: ICD-10-CM

## 2025-01-21 DIAGNOSIS — Z12.5 PROSTATE CANCER SCREENING: ICD-10-CM

## 2025-01-21 LAB
ALBUMIN SERPL-MCNC: 4.9 G/DL (ref 3.2–4.8)
ALBUMIN/GLOB SERPL: 1.7 {RATIO} (ref 1–2)
ALP LIVER SERPL-CCNC: 32 U/L
ALT SERPL-CCNC: 28 U/L
ANION GAP SERPL CALC-SCNC: 9 MMOL/L (ref 0–18)
AST SERPL-CCNC: 28 U/L (ref ?–34)
BASOPHILS # BLD AUTO: 0.07 X10(3) UL (ref 0–0.2)
BASOPHILS NFR BLD AUTO: 1.2 %
BILIRUB SERPL-MCNC: 1.2 MG/DL (ref 0.2–1.1)
BUN BLD-MCNC: 20 MG/DL (ref 9–23)
BUN/CREAT SERPL: 13.5 (ref 10–20)
CALCIUM BLD-MCNC: 10.3 MG/DL (ref 8.7–10.4)
CHLORIDE SERPL-SCNC: 107 MMOL/L (ref 98–112)
CHOLEST SERPL-MCNC: 145 MG/DL (ref ?–200)
CO2 SERPL-SCNC: 29 MMOL/L (ref 21–32)
CREAT BLD-MCNC: 1.48 MG/DL
DEPRECATED RDW RBC AUTO: 42.3 FL (ref 35.1–46.3)
EGFRCR SERPLBLD CKD-EPI 2021: 51 ML/MIN/1.73M2 (ref 60–?)
EOSINOPHIL # BLD AUTO: 0.22 X10(3) UL (ref 0–0.7)
EOSINOPHIL NFR BLD AUTO: 3.8 %
ERYTHROCYTE [DISTWIDTH] IN BLOOD BY AUTOMATED COUNT: 13.4 % (ref 11–15)
FASTING PATIENT LIPID ANSWER: YES
FASTING STATUS PATIENT QL REPORTED: YES
GLOBULIN PLAS-MCNC: 2.9 G/DL (ref 2–3.5)
GLUCOSE BLD-MCNC: 113 MG/DL (ref 70–99)
HCT VFR BLD AUTO: 49.7 %
HDLC SERPL-MCNC: 36 MG/DL (ref 40–59)
HGB BLD-MCNC: 16.6 G/DL
IMM GRANULOCYTES # BLD AUTO: 0.02 X10(3) UL (ref 0–1)
IMM GRANULOCYTES NFR BLD: 0.3 %
LDLC SERPL CALC-MCNC: 83 MG/DL (ref ?–100)
LYMPHOCYTES # BLD AUTO: 1.56 X10(3) UL (ref 1–4)
LYMPHOCYTES NFR BLD AUTO: 26.8 %
MCH RBC QN AUTO: 28.9 PG (ref 26–34)
MCHC RBC AUTO-ENTMCNC: 33.4 G/DL (ref 31–37)
MCV RBC AUTO: 86.4 FL
MONOCYTES # BLD AUTO: 0.38 X10(3) UL (ref 0.1–1)
MONOCYTES NFR BLD AUTO: 6.5 %
NEUTROPHILS # BLD AUTO: 3.58 X10 (3) UL (ref 1.5–7.7)
NEUTROPHILS # BLD AUTO: 3.58 X10(3) UL (ref 1.5–7.7)
NEUTROPHILS NFR BLD AUTO: 61.4 %
NONHDLC SERPL-MCNC: 109 MG/DL (ref ?–130)
OSMOLALITY SERPL CALC.SUM OF ELEC: 303 MOSM/KG (ref 275–295)
PLATELET # BLD AUTO: 191 10(3)UL (ref 150–450)
POTASSIUM SERPL-SCNC: 4.3 MMOL/L (ref 3.5–5.1)
PROT SERPL-MCNC: 7.8 G/DL (ref 5.7–8.2)
PSA SERPL-MCNC: 1.28 NG/ML (ref ?–4)
RBC # BLD AUTO: 5.75 X10(6)UL
SODIUM SERPL-SCNC: 145 MMOL/L (ref 136–145)
TRIGL SERPL-MCNC: 147 MG/DL (ref 30–149)
VLDLC SERPL CALC-MCNC: 23 MG/DL (ref 0–30)
WBC # BLD AUTO: 5.8 X10(3) UL (ref 4–11)

## 2025-01-21 PROCEDURE — 1159F MED LIST DOCD IN RCRD: CPT | Performed by: INTERNAL MEDICINE

## 2025-01-21 PROCEDURE — 3008F BODY MASS INDEX DOCD: CPT | Performed by: INTERNAL MEDICINE

## 2025-01-21 PROCEDURE — G0439 PPPS, SUBSEQ VISIT: HCPCS | Performed by: INTERNAL MEDICINE

## 2025-01-21 PROCEDURE — 85025 COMPLETE CBC W/AUTO DIFF WBC: CPT | Performed by: INTERNAL MEDICINE

## 2025-01-21 PROCEDURE — 36415 COLL VENOUS BLD VENIPUNCTURE: CPT | Performed by: INTERNAL MEDICINE

## 2025-01-21 PROCEDURE — 1125F AMNT PAIN NOTED PAIN PRSNT: CPT | Performed by: INTERNAL MEDICINE

## 2025-01-21 PROCEDURE — 3075F SYST BP GE 130 - 139MM HG: CPT | Performed by: INTERNAL MEDICINE

## 2025-01-21 PROCEDURE — 1170F FXNL STATUS ASSESSED: CPT | Performed by: INTERNAL MEDICINE

## 2025-01-21 PROCEDURE — 80061 LIPID PANEL: CPT | Performed by: INTERNAL MEDICINE

## 2025-01-21 PROCEDURE — 84153 ASSAY OF PSA TOTAL: CPT | Performed by: INTERNAL MEDICINE

## 2025-01-21 PROCEDURE — 3079F DIAST BP 80-89 MM HG: CPT | Performed by: INTERNAL MEDICINE

## 2025-01-21 PROCEDURE — 80053 COMPREHEN METABOLIC PANEL: CPT | Performed by: INTERNAL MEDICINE

## 2025-01-21 PROCEDURE — 96160 PT-FOCUSED HLTH RISK ASSMT: CPT | Performed by: INTERNAL MEDICINE

## 2025-01-21 RX ORDER — METOPROLOL SUCCINATE 100 MG/1
100 TABLET, EXTENDED RELEASE ORAL DAILY
Qty: 90 TABLET | Refills: 3 | Status: SHIPPED | OUTPATIENT
Start: 2025-01-21

## 2025-01-21 RX ORDER — FENOFIBRATE 160 MG/1
160 TABLET ORAL DAILY
Qty: 90 TABLET | Refills: 3 | Status: SHIPPED | OUTPATIENT
Start: 2025-01-21

## 2025-01-21 NOTE — PROGRESS NOTES
Subjective:   Toy Acosta is a 69 year old male who presents for Physical     Patient here for a MA supervisit and fu on htn, and high triglycerides.  Had colonoscopy 1 year ago.  Is due for Shingrix.  History/Other:    Chief Complaint Reviewed and Verified  No Further Nursing Notes to   Review  Medications Reviewed  Problem List Reviewed         Tobacco: non smoker  HPI:   Toy Acosta is a 69 year old male who presents for a MA Supervisit.    Patient Active Problem List   Diagnosis    HTN (hypertension)    Hypertriglyceridemia    Hyperlipidemia    Preoperative clearance    Screening for colorectal cancer       General Health     In the past six months, have you lost more than 10 pounds without trying?: (Patient-Rptd) 2 - No    Has your appetite been poor?: (Patient-Rptd) No    Type of Diet: (Patient-Rptd) Balanced;Low Salt;Low Carb    How does the patient maintain a good energy level?: (Patient-Rptd) Daily Walks    How would you describe your daily physical activity?: (Patient-Rptd) Light    How would you describe your current health state?: (Patient-Rptd) Good    How do you maintain positive mental well-being?: (Patient-Rptd) Social Interaction         Have you had any immunizations at another office such as Influenza, Hepatitis B, Tetanus, or Pneumococcal?: (Patient-Rptd) No     Functional Ability     Bathing or Showering: (Patient-Rptd) Able without help    Toileting: (Patient-Rptd) Able without help    Dressing: (Patient-Rptd) Able without help    Eating: (Patient-Rptd) Able without help    Driving: (Patient-Rptd) Able without help    Preparing your meals: (Patient-Rptd) Able without help    Managing money/bills: (Patient-Rptd) Able without help    Taking medications as prescribed: (Patient-Rptd) Able without help    Are you able to afford your medications?: (Patient-Rptd) Yes    Hearing Problems?: (Patient-Rptd) No     Functional Status     Hearing Problems?: (Patient-Rptd) No    Vision  Problems? : (Patient-Rptd) Yes    Difficulty walking?: (Patient-Rptd) No    Difficulty dressing or bathing?: (Patient-Rptd) No    Problems with daily activities? : (Patient-Rptd) No    Memory Problems?: (Patient-Rptd) No      Fall/Risk Assessment                                                              Depression Screening (PHQ-2/PHQ-9): Over the LAST 2 WEEKS                      Advance Directives     Do you have a healthcare power of ?: (Patient-Rptd) Yes    Do you have a living will?: (Patient-Rptd) No   Was Medicare Assessment Questionnaire completed by patient and sent to HIM for scanning?Yes    Please go to \"Cognitive Assessment\" under Medicare Assessment section in Charting, test patient and document.  .    Then, refresh your progress note to see your input here.  Cognitive Assessment     What day of the week is this?: Correct    What month is it?: Correct    What year is it?: Correct    Recall \"Ball\": Correct    Recall \"Flag\": Correct    Recall \"Tree\": Correct      ALLERGIES:   Allergies[1]    CURRENT MEDICATIONS:   Current Outpatient Medications   Medication Sig Dispense Refill    FENOFIBRATE 160 MG Oral Tab TAKE 1 TABLET BY MOUTH EVERY DAY 90 tablet 3    lisinopril 40 MG Oral Tab Take 1 tablet (40 mg total) by mouth daily. 90 tablet 3    metoprolol succinate  MG Oral Tablet 24 Hr Take 1 tablet (100 mg total) by mouth daily. 90 tablet 3      MEDICAL INFORMATION:   Past Medical History:    Adopted    Anxiety    Colon adenomas    Colon adenomas    x6    Colon polyp    benign    Diverticulosis    Lt sided    High blood pressure    High cholesterol    HTN (hypertension)    Hyperlipidemia    Hypertriglyceridemia    Internal hemorrhoids    Right shoulder tendonitis      Past Surgical History:   Procedure Laterality Date    Colonoscopy      Colonoscopy  12/03/2018    Colonoscopy N/A 12/3/2018    Procedure: COLONOSCOPY;  Surgeon: Edu Tate MD;  Location: [facility] ENDOSCOPY    Colonoscopy N/A  1/26/2024    Procedure: COLONOSCOPY WITH POLYPECTOMY;  Surgeon: Edu Tate MD;  Location: [facility]    Colonoscopy & polypectomy  12/1/2008      No family history on file.   SOCIAL HISTORY:   Social History     Socioeconomic History    Marital status: Single   Tobacco Use    Smoking status: Never    Smokeless tobacco: Never   Vaping Use    Vaping status: Never Used   Substance and Sexual Activity    Alcohol use: No    Drug use: No   Other Topics Concern    Caffeine Concern Yes     Comment: 1 cup of coffee daily    Exercise Yes     Comment: walking weather permitting 4-5 x wkly     Occ: retire : no      REVIEW OF SYSTEMS:   GENERAL: feels well otherwise  SKIN: denies any unusual skin lesions sebborhea on face  EYES: denies blurred vision or double vision progressive bifocals  HEENT: denies nasal congestion, sinus pain or ST  LUNGS: denies shortness of breath with exertion  CARDIOVASCULAR: denies chest pain on exertion  GI: denies abdominal pain, denies heartburn  : 1 per night nocturia, no complaint of urinary incontinence  MUSCULOSKELETAL: denies back pain right shoulder  NEURO: denies headaches  PSYCHE: denies depression or anxiety  HEMATOLOGIC: denies hx of anemia  ENDOCRINE: denies thyroid history  ALL/ASTHMA: denies hx of allergy or asthma    EXAM:   /90   Pulse 67   Ht 6' (1.829 m)   Wt 215 lb (97.5 kg)   SpO2 97%   BMI 29.16 kg/m²      >   Wt Readings from Last 6 Encounters:   01/21/25 215 lb (97.5 kg)   02/08/24 216 lb (98 kg)   01/26/24 215 lb (97.5 kg)   11/27/23 218 lb (98.9 kg)   02/09/23 220 lb (99.8 kg)   02/11/22 220 lb (99.8 kg)       >   BP Readings from Last 3 Encounters:   01/21/25 140/90   02/08/24 140/80   01/26/24 151/85       GENERAL: well developed, well nourished, in no apparent distress  SKIN: no rashes, no suspicious lesions  HEENT: atraumatic, normocephalic, ears and throat are clear  Hearing assessed via: Whispered Voice intact cerumen in right canal  EYES:  PERRLA, EOMI, conjunctiva are clear  Right Eye Visual Acuity: Corrected Left Eye Visual Acuity: Corrected   Right Eye Chart Acuity: 20/20 Left Eye Chart Acuity: 20/20   NECK: supple, no adenopathy, no bruits  CHEST: no chest tenderness  BREAST: no masses or discharge no gynecomastia  LUNGS: clear to auscultation  CARDIO: RRR without murmur  GI: good BS's, no masses, HSM or tenderness  : deferred  RECTAL:deferred  MUSCULOSKELETAL: back is not tender, FROM of the back  EXTREMITIES: no cyanosis, clubbing or edema.  NEURO: Oriented times three, cranial nerves are intact, motor and sensory are grossly intact    ASSESSMENT AND OTHER RELEVANT CHRONIC CONDITIONS:   Toy Acosta is a 69 year old male who presents for a Medicare Assessment.     PLAN SUMMARY:   Diagnoses and all orders for this visit:    Medicare annual wellness visit, subsequent    Primary hypertension    Hypertriglyceridemia         The patient indicates understanding of these issues and agrees to the plan.  The patient is asked to return in 12m for fu.       PREVENTATIVE SERVICES  INDICATIONS AND SCHEDULE Internal Lab or Procedure External Lab or Procedure   Diabetes Screening      HbgA1C   Annually HEMOGLOBIN A1c (% of total Hgb)   Date Value   02/09/2023 5.3         No data to display                Fasting Blood Sugar (FSB)Annually No results found for: \"GLUCOSE\"    Cardiovascular Disease Screening     LDL Annually LDL-CHOLESTEROL (mg/dL (calc))   Date Value   02/08/2024 86        EKG One Time     Colorectal Cancer Screening      Colonoscopy Screen every 10 years Health Maintenance   Topic Date Due    Colorectal Cancer Screening  01/26/2027    Update Health Maintenance if applicable    Flex Sigmoidoscopy Screen every 10 years No results found for this or any previous visit.      No data to display                 Fecal Occult Blood Annually No results found for: \"FOB\"      No data to display                Glaucoma Screening      Ophthalmology  Visit Annually     Prostate Cancer Screening      PSA  Annually Health Maintenance   Topic Date Due    PSA  02/08/2026     Update Health Maintenance if applicable   Immunizations      Influenza No orders found for this or any previous visit. Update Immunization Activity if applicable    Pneumococcal Orders placed or performed in visit on 02/12/21    Pneumococcal 23, Adult (Pneumovax) (23447) (Dx V03.82/Z23)    Update Immunization Activity if applicable    Hepatitis B No orders found for this or any previous visit. Update Immunization Activity if applicable    Tetanus No orders found for this or any previous visit. Update Immunization Activity if applicable        SPECIFIC DISEASE MONITORING Internal Lab or Procedure External Lab or Procedure   Annual Monitoring of Persistent     Medications (ACE/ARB, digoxin diuretics, anticonvulsants.)    Potassium  Annually POTASSIUM (mmol/L)   Date Value   02/08/2024 4.3         No data to display                Creatinine  Annually CREATININE (mg/dL)   Date Value   02/08/2024 1.21         No data to display                BUN  Annually UREA NITROGEN (BUN) (mg/dL)   Date Value   02/08/2024 24         No data to display                 Drug Serum Conc  Annually No results found for: \"DIGOXIN\", \"DIG\", \"VALP\"      No data to display                Diabetes      HgbA1C  Annually HEMOGLOBIN A1c (% of total Hgb)   Date Value   02/09/2023 5.3         No data to display                Creat/alb ratio  Annually      LDL  Annually LDL-CHOLESTEROL (mg/dL (calc))   Date Value   02/08/2024 86         No data to display                 Dilated Eye exam  Annually      No data to display                   No data to display                COPD      Spirometry Testing Annually No results found for this or any previous visit.      No data to display                    SUGGESTED VACCINATIONS - Influenza, Pneumococcal, Zoster, Tetanus     Immunization History   Administered Date(s) Administered     Covid-19 Vaccine Pfizer 30 mcg/0.3 ml 03/16/2021, 04/06/2021, 10/06/2021    Covid-19 Vaccine Pfizer Bivalent 30mcg/0.3mL 09/22/2022    Covid-19 Vaccine Pfizer John-Sucrose 30 mcg/0.3 ml 04/19/2022    FLU VAC High Dose 65 YRS & Older PRSV Free (09367) 10/17/2022, 12/10/2023    High Dose Fluzone Influenza Vaccine, 65yr+ PF 0.5mL (90079) 10/12/2024    Pfizer Covid-19 Vaccine 30mcg/0.3ml 12yrs+ 10/26/2023, 07/17/2024    Pneumococcal Conjugate PCV20 04/22/2022    Pneumovax 23 02/12/2021                Current Outpatient Medications   Medication Sig Dispense Refill    FENOFIBRATE 160 MG Oral Tab TAKE 1 TABLET BY MOUTH EVERY DAY 90 tablet 3    lisinopril 40 MG Oral Tab Take 1 tablet (40 mg total) by mouth daily. 90 tablet 3    metoprolol succinate  MG Oral Tablet 24 Hr Take 1 tablet (100 mg total) by mouth daily. 90 tablet 3         Review of Systems:  Review of Systems      Objective:   /90   Pulse 67   Ht 6' (1.829 m)   Wt 215 lb (97.5 kg)   SpO2 97%   BMI 29.16 kg/m²  Estimated body mass index is 29.16 kg/m² as calculated from the following:    Height as of this encounter: 6' (1.829 m).    Weight as of this encounter: 215 lb (97.5 kg).  Physical Exam      Assessment & Plan:   1. Medicare annual wellness visit, subsequent (Primary)check fasting labs  2. Primary hypertension controlled with lisinopril 40 mg / metoprolol  3. Hypertriglyceridemia on fenofibrate        No follow-ups on file.    Rachel Jin MD, 1/21/2025, 9:30 AM        [1] No Known Allergies

## 2025-01-22 ENCOUNTER — MED REC SCAN ONLY (OUTPATIENT)
Dept: INTERNAL MEDICINE CLINIC | Facility: CLINIC | Age: 70
End: 2025-01-22

## 2025-01-22 ENCOUNTER — IMMUNIZATION (OUTPATIENT)
Dept: LAB | Age: 70
End: 2025-01-22
Attending: EMERGENCY MEDICINE
Payer: MEDICARE

## 2025-01-22 DIAGNOSIS — Z23 NEED FOR VACCINATION: Primary | ICD-10-CM

## 2025-01-22 PROCEDURE — 90480 ADMN SARSCOV2 VAC 1/ONLY CMP: CPT

## 2025-02-23 ENCOUNTER — OFFICE VISIT (OUTPATIENT)
Dept: FAMILY MEDICINE CLINIC | Facility: CLINIC | Age: 70
End: 2025-02-23
Payer: COMMERCIAL

## 2025-02-23 VITALS
WEIGHT: 214 LBS | RESPIRATION RATE: 16 BRPM | BODY MASS INDEX: 28.99 KG/M2 | TEMPERATURE: 97 F | HEART RATE: 65 BPM | DIASTOLIC BLOOD PRESSURE: 90 MMHG | HEIGHT: 72 IN | SYSTOLIC BLOOD PRESSURE: 164 MMHG

## 2025-02-23 DIAGNOSIS — R31.0 GROSS HEMATURIA: Primary | ICD-10-CM

## 2025-02-23 DIAGNOSIS — R03.0 ELEVATED BLOOD PRESSURE READING: ICD-10-CM

## 2025-02-23 LAB
BILIRUBIN: NEGATIVE
GLUCOSE (URINE DIPSTICK): NEGATIVE MG/DL
KETONES (URINE DIPSTICK): NEGATIVE MG/DL
MULTISTIX LOT#: ABNORMAL NUMERIC
NITRITE, URINE: POSITIVE
PH, URINE: 6 (ref 4.5–8)
PROTEIN (URINE DIPSTICK): 100 MG/DL
SPECIFIC GRAVITY: 1.01 (ref 1–1.03)
UROBILINOGEN,SEMI-QN: 0.2 MG/DL (ref 0–1.9)

## 2025-02-23 PROCEDURE — 87086 URINE CULTURE/COLONY COUNT: CPT | Performed by: NURSE PRACTITIONER

## 2025-02-23 RX ORDER — CEPHALEXIN 500 MG/1
500 CAPSULE ORAL 3 TIMES DAILY
Qty: 21 CAPSULE | Refills: 0 | Status: SHIPPED | OUTPATIENT
Start: 2025-02-23 | End: 2025-03-02

## 2025-02-23 NOTE — PROGRESS NOTES
CHIEF COMPLAINT:     Chief Complaint   Patient presents with    UTI       HPI:   Toy Acosta is a 70 year old male who presents with symptoms of blood in urine    Pt reporting blood in urine this AM. Pt reports after initial blood in urine, urine color is normal. Pt denies dysuria, frequency of urination, urinary difficulty/hesitation, nocturia, fevers, weight loss, weak stream, or back pain. Tx: none.  Associated symptoms: none.   Denies flank pain, fever, nausea, or vomiting.      Pt denies URI symptoms.     Other  hx: Pt denies any hx of kidney or bladder issues  Hx of STI: Pt denies concern    Denies penile discharge, itching, lesions, swelling/erythema of the scrotum or penis, new sexual partners in the last 3 months, or recent unprotected sexual intercourse.     Last PSA in Jan 25' was 1.28    Current Outpatient Medications   Medication Sig Dispense Refill    cephALEXin 500 MG Oral Cap Take 1 capsule (500 mg total) by mouth 3 (three) times daily for 7 days. 21 capsule 0    Fenofibrate 160 MG Oral Tab Take 1 tablet (160 mg total) by mouth daily. 90 tablet 3    metoprolol succinate  MG Oral Tablet 24 Hr Take 1 tablet (100 mg total) by mouth daily. 90 tablet 3    lisinopril 40 MG Oral Tab Take 1 tablet (40 mg total) by mouth daily. 90 tablet 3      Past Medical History:    Adopted    Anxiety    Colon adenomas    Colon adenomas    x6    Colon polyp    benign    Diverticulosis    Lt sided    Essential hypertension    High blood pressure    High cholesterol    HTN (hypertension)    Hyperlipidemia    Hypertriglyceridemia    Internal hemorrhoids    Right shoulder tendonitis      Social History:  Social History     Socioeconomic History    Marital status: Single   Tobacco Use    Smoking status: Never     Passive exposure: Never    Smokeless tobacco: Never   Vaping Use    Vaping status: Never Used   Substance and Sexual Activity    Alcohol use: No    Drug use: No   Other Topics Concern    Caffeine  Concern Yes     Comment: 1 cup of coffee daily    Exercise Yes     Comment: walking weather permitting 4-5 x wkly         REVIEW OF SYSTEMS:   GENERAL: Denies fever, chills, or night sweats.   SKIN: no rashes, no skin wounds or ulcers.  EYES:denies blurred vision or double vision  HEENT: no congestion  CARDIOVASCULAR: denies chest pain  LUNGS: denies shortness of breath or cough  GI: See HPI. No N/V/C/D.   : See HPI.  Lymph: Denies adenopathy  NEURO: no headaches.    EXAM:   BP (!) 164/90   Pulse 65   Temp 97.2 °F (36.2 °C) (Tympanic)   Resp 16   Ht 6' (1.829 m)   Wt 214 lb (97.1 kg)   BMI 29.02 kg/m²   GENERAL: well developed, well nourished,in no apparent distress  CARDIO: RRR, no murmurs  LUNGS: clear to ausculation bilaterally, no wheezing or rhonchi  GI: BS present x 4 and normoactive.  No hepatosplenomegaly.  : No suprapubic tenderness. No bladder distention, or CVAT   Extremities: No edema    Recent Results (from the past 24 hours)   URINALYSIS, AUTO, W/O SCOPE    Collection Time: 02/23/25  9:38 AM   Result Value Ref Range    Glucose Urine Negative Negative mg/dL    Bilirubin Urine Negative Negative    Ketones, UA Negative Negative - Trace mg/dL    Spec Gravity 1.015 1.005 - 1.030    Blood Urine Large (A) Negative    PH Urine 6.0 5.0 - 8.0    Protein Urine 100 (A) Negative - Trace mg/dL    Urobilinogen Urine 0.2 0.2 - 1.0 mg/dL    Nitrite Urine Positive (A) Negative    Leukocyte Esterase Urine Moderate (A) Negative    APPEARANCE Cloudy Clear    Color Urine Red Yellow    Multistix Lot# [id number] Numeric    Multistix Expiration Date 10/31/2025 Date         ASSESSMENT AND PLAN:   Toy Acosta is a 70 year old male presents with UTI symptoms.    ASSESSMENT:  Encounter Diagnoses   Name Primary?    Gross hematuria Yes    Elevated blood pressure reading        PLAN: Meds as listed below.      Meds & Refills for this Visit:  Requested Prescriptions     Signed Prescriptions Disp Refills    cephALEXin 500  MG Oral Cap 21 capsule 0     Sig: Take 1 capsule (500 mg total) by mouth 3 (three) times daily for 7 days.     Will start keflex as directed. PT without fever, urinary symptoms, back pain, or difficulty urinating.     Strict ER precautions discussed.     Will send urine culture. Pt will be contacted with results.     Recommended follow up urology if negative urine culture and/or persistent symptoms. Will forward chart to PCP as well.     Comfort measures as described in Patient Instructions.     Discussed with patient that their blood pressure is elevated. Risks of uncontrolled blood pressure discussed. Pt reported did not take his metoprolol. Pt advised to take medication as directed. Advised patient to follow up with their PCP within for further management and evaluation. Discussed with patient signs and symptoms cardiac in nature, and if present to seek emergency care immediately. Pt verbalized understanding.     Patient education in discharge summary reviewed with patient.      Discussed s/s of worsening infection/condition with Patient and importance of prompt medical re-evaluation including when to seek emergency care. Patient voiced understanding    Increase fluids     May consider OTC tylenol as needed and directed on packaging for pain/fever    Risk, benefits, and side effects of medication discussed. Stressed importance of completing full course of antibiotic unless told otherwise. Pt verbalized understanding.     Patient Instructions   See attached patient care instructions.      Dr. Pagan - urology    [facility]    [address] [place] [id number] [phone]        The patient indicates understanding of these issues and agrees to the plan.  The patient is asked to return in 1-2 days if no improvement, sooner if worsening symptoms. To ER if any acute symptoms such as flank pain, fever, chills, vomiting, or nausea.

## 2025-02-23 NOTE — PATIENT INSTRUCTIONS
See attached patient care instructions.      Dr. Pagan - urology    [address]  [address] [place] [address]    [phone]

## 2025-03-06 ENCOUNTER — OFFICE VISIT (OUTPATIENT)
Dept: SURGERY | Facility: CLINIC | Age: 70
End: 2025-03-06
Payer: COMMERCIAL

## 2025-03-06 VITALS
HEIGHT: 72 IN | WEIGHT: 210 LBS | HEART RATE: 64 BPM | DIASTOLIC BLOOD PRESSURE: 88 MMHG | SYSTOLIC BLOOD PRESSURE: 178 MMHG | BODY MASS INDEX: 28.44 KG/M2

## 2025-03-06 DIAGNOSIS — R31.0 GROSS HEMATURIA: Primary | ICD-10-CM

## 2025-03-06 PROCEDURE — 3008F BODY MASS INDEX DOCD: CPT | Performed by: UROLOGY

## 2025-03-06 PROCEDURE — 3079F DIAST BP 80-89 MM HG: CPT | Performed by: UROLOGY

## 2025-03-06 PROCEDURE — 3077F SYST BP >= 140 MM HG: CPT | Performed by: UROLOGY

## 2025-03-06 PROCEDURE — 99204 OFFICE O/P NEW MOD 45 MIN: CPT | Performed by: UROLOGY

## 2025-03-06 PROCEDURE — 1159F MED LIST DOCD IN RCRD: CPT | Performed by: UROLOGY

## 2025-03-06 NOTE — PROGRESS NOTES
[facility] Urology  Initial Office Consultation    HPI:   Toy Acosta is a 70 year old male here today for consultation at the request of, and a copy of this note will be sent to, Rachel Jin MD.    1.  Gross Hematuria  Patient had an episode of painless gross hematuria around the last week of February 2025.  This lasted 2 days.  He noted associated clots.  No associated fevers or chills, flank pain, or dysuria.    Dipstick urinalysis was positive for leuks and nitrites.  Urine culture was negative.  He was treated with Keflex 500 mg 3 times daily for 7 days.    His hematuria eventually resolved and has not recurred.    No family history of prostate cancer.  Screening PSA from January 2025 normal at 1.2 ng/mL.      PAST MEDICAL HISTORY: Hypertension.  Hyperlipidemia.  Anxiety.    PAST SURGICAL HISTORY: None.    SOCIAL HISTORY: Patient has a life partner.  No children.  No smoking.  Rare social alcohol.  Retired  for insurance.     No family history on file.  Patient is adopted.    Allergies: Patient has no known allergies.      REVIEW OF SYSTEMS:  Pertinent positives and negatives per HPI. A 12-point ROS was performed and is otherwise negative.       EXAM:  BP (!) 178/88 (BP Location: Left arm, Patient Position: Sitting, Cuff Size: adult)   Pulse 64   Ht 6' (1.829 m)   Wt 210 lb (95.3 kg)   BMI 28.48 kg/m²     Physical Exam  Constitutional:       General: He is not in acute distress.     Appearance: He is well-developed.   HENT:      Head: Normocephalic.   Eyes:      General: No scleral icterus.  Cardiovascular:      Rate and Rhythm: Normal rate.   Pulmonary:      Effort: Pulmonary effort is normal.   Abdominal:      General: There is no distension.      Palpations: Abdomen is soft.      Tenderness: There is no abdominal tenderness.   Genitourinary:     Penis: Circumcised.       Testes: Normal.   Skin:     General: Skin is warm and dry.   Neurological:      Mental Status:  He is alert and oriented to person, place, and time.   Psychiatric:         Mood and Affect: Mood normal.         Behavior: Behavior normal.       LABS:  See HPI.      IMAGING:  No results found.      IMPRESSION:  70 year old male with painless gross hematuria.    I had a lengthy discussion with Toy Acosta regarding the finding of gross hematuria.  We went over the relevant anatomy of the  tract as well as the different possible etiologies and differential diagnoses, including benign causes such as infections, stones, recent instrumentation of the genitourinary tract, or benign enlargement of the prostate (in males).  We also discussed more serious potential causes including malignancy or tumors in the upper or lower urinary tracts.    I then discussed the proposed workup for gross hematuria.  This includes a voided urine sample for cytology, imaging in the form of a CT-Urogram to evaluate the upper urinary tracts, as well as a cystoscopy to evaluate the bladder and urethra.    Further management and recommendations will be based on the results of the workup as outlined above. The patient wishes to proceed with the workup as discussed.  They asked appropriate questions all of which were answered to their satisfaction.      PLAN:  1.  Urine for cytology  2.  CT urogram  3.  Schedule cystoscopy      Alcides Martell MD  3/6/2025

## 2025-03-07 LAB — NON GYNE INTERPRETATION: NEGATIVE

## 2025-03-19 ENCOUNTER — HOSPITAL ENCOUNTER (OUTPATIENT)
Dept: CT IMAGING | Facility: HOSPITAL | Age: 70
Discharge: HOME | End: 2025-03-19
Attending: UROLOGY
Payer: MEDICARE

## 2025-03-19 DIAGNOSIS — R31.0 GROSS HEMATURIA: ICD-10-CM

## 2025-03-19 LAB
CREAT BLD-MCNC: 1.3 MG/DL
EGFRCR SERPLBLD CKD-EPI 2021: 59 ML/MIN/1.73M2 (ref 60–?)

## 2025-03-19 PROCEDURE — 74178 CT ABD&PLV WO CNTR FLWD CNTR: CPT | Performed by: UROLOGY

## 2025-03-19 PROCEDURE — 82565 ASSAY OF CREATININE: CPT

## 2025-03-26 RX ORDER — LISINOPRIL 40 MG/1
40 TABLET ORAL DAILY
Qty: 90 TABLET | Refills: 3 | Status: SHIPPED | OUTPATIENT
Start: 2025-03-26

## 2025-04-02 ENCOUNTER — PROCEDURE (OUTPATIENT)
Dept: SURGERY | Facility: CLINIC | Age: 70
End: 2025-04-02
Payer: COMMERCIAL

## 2025-04-02 VITALS — DIASTOLIC BLOOD PRESSURE: 89 MMHG | HEART RATE: 60 BPM | SYSTOLIC BLOOD PRESSURE: 166 MMHG | RESPIRATION RATE: 16 BRPM

## 2025-04-02 DIAGNOSIS — N40.0 ENLARGED PROSTATE: ICD-10-CM

## 2025-04-02 DIAGNOSIS — N20.0 NEPHROLITHIASIS: ICD-10-CM

## 2025-04-02 DIAGNOSIS — R31.0 GROSS HEMATURIA: Primary | ICD-10-CM

## 2025-04-02 PROCEDURE — 99214 OFFICE O/P EST MOD 30 MIN: CPT | Performed by: UROLOGY

## 2025-04-02 PROCEDURE — 1159F MED LIST DOCD IN RCRD: CPT | Performed by: UROLOGY

## 2025-04-02 PROCEDURE — 3077F SYST BP >= 140 MM HG: CPT | Performed by: UROLOGY

## 2025-04-02 PROCEDURE — 3079F DIAST BP 80-89 MM HG: CPT | Performed by: UROLOGY

## 2025-04-02 PROCEDURE — 1126F AMNT PAIN NOTED NONE PRSNT: CPT | Performed by: UROLOGY

## 2025-04-02 PROCEDURE — 52000 CYSTOURETHROSCOPY: CPT | Performed by: UROLOGY

## 2025-04-02 RX ORDER — CIPROFLOXACIN 500 MG/1
500 TABLET, FILM COATED ORAL ONCE
Status: COMPLETED | OUTPATIENT
Start: 2025-04-02 | End: 2025-04-02

## 2025-04-02 RX ADMIN — CIPROFLOXACIN 500 MG: 500 TABLET, FILM COATED ORAL at 10:13:00

## 2025-04-02 NOTE — PROGRESS NOTES
[facility] Urology  Follow-Up Visit    HPI: Toy Acosta is a 70 year old male presents for a follow up visit. Patient was last seen on 3/6/2025.    INTERVAL HISTORY: Patient presents for cystoscopy for further evaluation of gross hematuria that occurred 2025 and lasted 2 days.  Associated clots.    Dipstick urinalysis positive for leuks and nitrites.  Urine culture was negative.  Treated with Keflex 500 mg 3 times daily for 7 days.    Voided urine cytology 3/6/2025 was benign.    CT urogram completed 3/19/2025 revealed a 6 x 6 x 4 mm nonobstructing left lower pole kidney stone.  Additional punctate stone in the left upper pole.  No ureteral stones or hydronephrosis.  No renal masses or ureteral filling defects.    He denies any significant LUTS at baseline.    He denies recurrent gross hematuria.    Reviewed past medical, surgical, family, and social history.  Reviewed med list and allergies.      REVIEW OF SYSTEMS:  Pertinent positives and negatives per HPI. A 10-point ROS was performed and is otherwise negative.       EXAM:  BP (!) 166/89 (BP Location: Left arm, Patient Position: Sitting, Cuff Size: large)   Pulse 60   Resp 16     Physical Exam  Constitutional:       Appearance: He is well-developed.   HENT:      Head: Normocephalic.   Eyes:      General: No scleral icterus.  Cardiovascular:      Rate and Rhythm: Normal rate.   Pulmonary:      Effort: Pulmonary effort is normal.   Skin:     General: Skin is warm and dry.   Neurological:      Mental Status: He is alert and oriented to person, place, and time.   Psychiatric:         Mood and Affect: Mood normal.         Behavior: Behavior normal.       PATHOLOGY:  See HPI.      LABS:  See HPI.      IMAGING:  CT UROGRAM (W+WO) (3/19/2025) Which I personally reviewed.  I agree with final radiology report revealin. Nonobstructing left renal calculi are apparent. No evidence of ureterolithiasis or obstructive  hydroureteronephrosis.  2. Probable bilateral renal cysts; no discrete suspicious enhancing renal lesions are detected.   3. Prostatomegaly with probable chronic outlet obstruction and bilateral bladder diverticula.  4. Cholelithiasis without CT evidence acute complication.  5. Uncomplicated proximal distal colonic diverticulosis.  6. Hepatomegaly with suspected underlying hepatic steatosis.  7. Left adrenal myelolipoma.  8. Lesser incidental findings as above.         UROLOGY PROCEDURE:    CYSTOURETHROSCOPY  Informed consent was obtained for the procedure. The site was prepped and draped in the usual sterile fashion. An Ambu 16 Trinidadian flexible cystoscope was utilized for the procedure.    Anesthesia:  2% lidocaine gel.    Urethra: Normal without strictures or masses.    Prostate / Pelvic: Abnormal 1-2+ enlarged mainly with lateral lobe hypertrophy .    Bladder: Normal.  No tumor, stone, or glomerulation.  Several shallow diverticula noted.    U.O's: Normal.    Trabeculation: 2+.    POST CYSTOSCOPY MEDICATIONS: Sample one tablet Cipro 500 mg given to patient.    DIAGNOSIS: Mild to moderate prostate enlargement.  Moderately trabeculated bladder.       IMPRESSION:  70 year old male with an episode of gross hematuria February 2025.    Evaluation completed including CT urogram, urine cytology, and cystoscopy.    Voided urine cytology benign.    CT urogram revealing a 6 mm nonobstructing left kidney stone.  Additional punctate nonobstructing left kidney stone.  Patient asymptomatic from a kidney stone standpoint.    Cystoscopy revealing mild to moderate BPH with moderately trabeculated bladder.  No tumors or stones or lesions.    Cystoscopic findings were reviewed with the patient.    Following the procedure I had a separate, clinically significant and clearly identifiable discussion with the patient regarding further management of his nephrolithiasis that was detected on his CT urogram.    Different stone management  options were discussed including watchful waiting, medical expulsive therapy, ESWL with or without pre-stenting, ureteroscopy with laser lithotripsy.    Rationale and approach as well as benefits, risks, possible complications and reasonable alternatives of each treatment were discussed with the patient.  Stone clearance rates were discussed as well as the expected postoperative course of recovery.     If inserted, we discussed the eventual need for stent removal which is usually performed in the office setting.    We discussed risks of surgery including but not limited to medical and anesthetic complications, bleeding, infection, damage to surrounding organs or structures, ureteral injury, stricture formation, and need for additional procedures.    Informational material was provided.  He will consider his options but is leaning towards conservative management for the time being.    We discussed his prostate enlargement and trabeculated bladder.  He reports minimal LUTS At baseline.  Options reviewed including continuing conservative management with watchful waiting, initiating medical therapy for BPH, considering minimally invasive surgical treatment options.    He elects watchful waiting.     All questions were answered.        PLAN:  1.  Patient will consider management options regarding asymptomatic nonobstructing 6 mm left kidney stone.    2.  Watchful waiting of BPH with minimal LUTS.    3.  Return for follow-up if recurrent gross hematuria.        Alcides Martell MD  4/2/2025

## 2025-07-02 DIAGNOSIS — R73.9 HYPERGLYCEMIA: Primary | ICD-10-CM

## 2025-07-03 ENCOUNTER — LAB ENCOUNTER (OUTPATIENT)
Dept: LAB | Age: 70
End: 2025-07-03
Attending: INTERNAL MEDICINE
Payer: MEDICARE

## 2025-07-03 LAB
ANION GAP SERPL CALC-SCNC: 6 MMOL/L (ref 0–18)
BUN BLD-MCNC: 19 MG/DL (ref 9–23)
BUN/CREAT SERPL: 15.1 (ref 10–20)
CALCIUM BLD-MCNC: 9.2 MG/DL (ref 8.7–10.4)
CHLORIDE SERPL-SCNC: 107 MMOL/L (ref 98–112)
CO2 SERPL-SCNC: 28 MMOL/L (ref 21–32)
CREAT BLD-MCNC: 1.26 MG/DL (ref 0.7–1.3)
EGFRCR SERPLBLD CKD-EPI 2021: 61 ML/MIN/1.73M2 (ref 60–?)
EST. AVERAGE GLUCOSE BLD GHB EST-MCNC: 100 MG/DL (ref 68–126)
FASTING STATUS PATIENT QL REPORTED: YES
GLUCOSE BLD-MCNC: 98 MG/DL (ref 70–99)
HBA1C MFR BLD: 5.1 % (ref ?–5.7)
OSMOLALITY SERPL CALC.SUM OF ELEC: 294 MOSM/KG (ref 275–295)
POTASSIUM SERPL-SCNC: 4.1 MMOL/L (ref 3.5–5.1)
SODIUM SERPL-SCNC: 141 MMOL/L (ref 136–145)

## 2025-07-03 PROCEDURE — 83036 HEMOGLOBIN GLYCOSYLATED A1C: CPT | Performed by: INTERNAL MEDICINE

## 2025-07-03 PROCEDURE — 80048 BASIC METABOLIC PNL TOTAL CA: CPT | Performed by: INTERNAL MEDICINE

## 2025-07-03 PROCEDURE — 36415 COLL VENOUS BLD VENIPUNCTURE: CPT | Performed by: INTERNAL MEDICINE

## (undated) DEVICE — Device: Brand: DUAL NARE NASAL CANNULAE FEMALE LUER CON 7FT O2 TUBE

## (undated) DEVICE — KIT ENDO ORCAPOD 160/180/190

## (undated) DEVICE — Device: Brand: DEFENDO AIR/WATER/SUCTION AND BIOPSY VALVE

## (undated) DEVICE — LINE MNTR ADLT SET O2 INTMD

## (undated) DEVICE — LASSO POLYPECTOMY SNARE: Brand: LASSO

## (undated) DEVICE — SNARE CAPTIFLEX MICRO-OVL OLY

## (undated) DEVICE — SNARE OPTMZ PLPCTM TRP

## (undated) DEVICE — ENDOSCOPY PACK - LOWER: Brand: MEDLINE INDUSTRIES, INC.

## (undated) DEVICE — FORCEP RADIAL JAW 4

## (undated) DEVICE — 60 ML SYRINGE REGULAR TIP: Brand: MONOJECT

## (undated) DEVICE — KIT CLEAN ENDOKIT 1.1OZ GOWNX2

## (undated) DEVICE — GIJAW SINGLE-USE BIOPSY FORCEPS WITH NEEDLE: Brand: GIJAW

## (undated) NOTE — LETTER
[facility], [address] [place] [address], [address]  [phone]                12/03/18      [place] [address] [place] [address] [place]      Dear [place],    I reviewed the pathology report from

## (undated) NOTE — LETTER
[facility]  Administration of Anesthesia  I, Toy Acosta agree to be cared for by a physician anesthesiologist alone and/or with a nurse anesthetist, who is specially trained to monitor me and give me medicine to put me to sleep or keep me comfortable during my procedure    I understand that my anesthesiologist and/or anesthetist is not an employee or agent of [facility] or [employer]. He or she works for [facility]    As the patient asking for anesthesia services, I agree to:  Allow the anesthesiologist (anesthesia doctor) to give me medicine and do additional procedures as necessary. Some examples are: Starting or using an “IV” to give me medicine, fluids or blood during my procedure, and having a breathing tube placed to help me breathe when I’m asleep (intubation). In the event that my heart stops working properly, I understand that my anesthesiologist will make every effort to sustain my life, unless otherwise directed by [facility] Do Not Resuscitate documents.  Tell my anesthesia doctor before my procedure:  If I am pregnant.  The last time that I ate or drank.  iii. All of the medicines I take (including prescriptions, herbal supplements, and pills I can buy without a prescription (including street drugs/illegal medications). Failure to inform my anesthesiologist about these medicines may increase my risk of anesthetic complications.  iv.If I am allergic to anything or have had a reaction to anesthesia before.  I understand how the anesthesia medicine will help me (benefits).  I understand that with any type of anesthesia medicine there are risks:  The most common risks are: nausea, vomiting, sore throat, muscle soreness, damage to my eyes, mouth, or teeth (from breathing tube placement).  Rare risks include: remembering what happened during my procedure, allergic reactions to medications, injury to my airway, heart, lungs, vision, nerves, or  muscles and in extremely rare instances death.  My doctor has explained to me other choices available to me for my care (alternatives).  Pregnant Patients (“epidural”):  I understand that the risks of having an epidural (medicine given into my back to help control pain during labor), include itching, low blood pressure, difficulty urinating, headache or slowing of the baby’s heart. Very rare risks include infection, bleeding, seizure, irregular heart rhythms and nerve injury.  Regional Anesthesia (“spinal”, “epidural”, & “nerve blocks”):  I understand that rare but potential complications include headache, bleeding, infection, seizure, irregular heart rhythms, and nerve injury.    _____________________________________________________________________________  Patient (or Representative) Signature/Relationship to Patient  Date   Time    _____________________________________________________________________________   Name (if used)    Language/Organization   Time    _____________________________________________________________________________  Nurse Anesthetist Signature     Date   Time  _____________________________________________________________________________  Anesthesiologist Signature     Date   Time  I have discussed the procedure and information above with the patient (or patient’s representative) and answered their questions. The patient or their representative has agreed to have anesthesia services.    _____________________________________________________________________________  Witness        Date   Time  I have verified that the signature is that of the patient or patient’s representative, and that it was signed before the procedure  Patient Name: Toy Acosta     : 1955                 Printed: 2024 at 8:12 AM    Medical Record #: [id number]                                            Page 1 of 1  ----------ANESTHESIA CONSENT----------

## (undated) NOTE — LETTER
[facility]  [address], [place], [place]  Authorization for Surgical Operation and Procedure                                                                                           I hereby authorize Edu Tate MD, my physician and his/her assistants (if applicable), which may include medical students, residents, and/or fellows, to perform the following surgical operation/ procedure and administer such anesthesia as may be determined necessary by my physician: Operation/Procedure name (s) COLONOSCOPY on Toy Acosta   2.   I recognize that during the surgical operation/procedure, unforeseen conditions may necessitate additional or different procedures than those listed above.  I, therefore, further authorize and request that the above-named surgeon, assistants, or designees perform such procedures as are, in their judgment, necessary and desirable.    3.   My surgeon/physician has discussed prior to my surgery the potential benefits, risks and side effects of this procedure; the likelihood of achieving goals; and potential problems that might occur during recuperation.  They also discussed reasonable alternatives to the procedure, including risks, benefits, and side effects related to the alternatives and risks related to not receiving this procedure.  I have had all my questions answered and I acknowledge that no guarantee has been made as to the result that may be obtained.    4.   Should the need arise during my operation/procedure, which includes change of level of care prior to discharge, I also consent to the administration of blood and/or blood products.  Further, I understand that despite careful testing and screening of blood or blood products by collecting agencies, I may still be subject to ill effects as a result of receiving a blood transfusion and/or blood products.  The following are some, but not all, of the potential risks that can occur: fever and allergic  reactions, hemolytic reactions, transmission of diseases such as Hepatitis, AIDS and Cytomegalovirus (CMV) and fluid overload.  In the event that I wish to have an autologous transfusion of my own blood, or a directed donor transfusion, I will discuss this with my physician.  Check only if Refusing Blood or Blood Products  I understand refusal of blood or blood products as deemed necessary by my physician may have serious consequences to my condition to include possible death. I hereby assume responsibility for my refusal and release the hospital, its personnel, and my physicians from any responsibility for the consequences of my refusal.    o  Refuse   5.   I authorize the use of any specimen, organs, tissues, body parts or foreign objects that may be removed from my body during the operation/procedure for diagnosis, research or teaching purposes and their subsequent disposal by hospital authorities.  I also authorize the release of specimen test results and/or written reports to my treating physician on the hospital medical staff or other referring or consulting physicians involved in my care, at the discretion of the Pathologist or my treating physician.    6.   I consent to the photographing or videotaping of the operations or procedures to be performed, including appropriate portions of my body for medical, scientific, or educational purposes, provided my identity is not revealed by the pictures or by descriptive texts accompanying them.  If the procedure has been photographed/videotaped, the surgeon will obtain the original picture, image, videotape or CD.  The hospital will not be responsible for storage, release or maintenance of the picture, image, tape or CD.    7.   I consent to the presence of a  or observers in the operating room as deemed necessary by my physician or their designees.    8.   I recognize that in the event my procedure results in extended X-Ray/fluoroscopy time, I may  develop a skin reaction.    9. If I have a Do Not Attempt Resuscitation (DNAR) order in place, that status will be suspended while in the operating room, procedural suite, and during the recovery period unless otherwise explicitly stated by me (or a person authorized to consent on my behalf). The surgeon or my attending physician will determine when the applicable recovery period ends for purposes of reinstating the DNAR order.  10. Patients having a sterilization procedure: I understand that if the procedure is successful the results will be permanent and it will therefore be impossible for me to inseminate, conceive, or bear children.  I also understand that the procedure is intended to result in sterility, although the result has not been guaranteed.   11. I acknowledge that my physician has explained sedation/analgesia administration to me including the risk and benefits I consent to the administration of sedation/analgesia as may be necessary or desirable in the judgment of my physician.    I CERTIFY THAT I HAVE READ AND FULLY UNDERSTAND THE ABOVE CONSENT TO OPERATION and/or OTHER PROCEDURE.     _________________________________________ _________________________________     ___________________________________  Signature of Patient     Signature of Responsible Person                   Printed Name of Responsible Person                              _________________________________________ ______________________________        ___________________________________  Signature of Witness         Date  Time         Relationship to Patient    STATEMENT OF PHYSICIAN My signature below affirms that prior to the time of the procedure; I have explained to the patient and/or his/her legal representative, the risks and benefits involved in the proposed treatment and any reasonable alternative to the proposed treatment. I have also explained the risks and benefits involved in refusal of the proposed treatment and alternatives  to the proposed treatment and have answered the patient's questions. If I have a significant financial interest in a co-management agreement or a significant financial interest in any product or implant, or other significant relationship used in this procedure/surgery, I have disclosed this and had a discussion with my patient.     _______________________________________________________________ _____________________________  (Signature of Physician)                                                                                         (Date)                                   (Time)  Patient Name: Toy Acosta    : 1955   Printed: 2024      Medical Record #: [id number]                                              Page 1 of 1

## (undated) NOTE — MR AVS SNAPSHOT
[address] [facility] [address] [id number] [phone]               Thank you for choosing us for your health care visit with Arlene Samson MD.  We are glad to serve you and happy to provide you with [facility] Sign up for happyviewt, your secure online medical record. Envis will allow you to access patient instructions from your recent visit,  view other health information, and more. To sign up or find more information, go to [email or web address] and cl increments are effective and add up over the week   2 ½ hours per week – spread out over time Use a thierno to keep you motivated   Don’t forget strength training with weights and resistance Set goals and track your progress   You don’t need to join a gym.